# Patient Record
Sex: FEMALE | Race: WHITE | NOT HISPANIC OR LATINO | Employment: FULL TIME | URBAN - METROPOLITAN AREA
[De-identification: names, ages, dates, MRNs, and addresses within clinical notes are randomized per-mention and may not be internally consistent; named-entity substitution may affect disease eponyms.]

---

## 2020-05-26 ENCOUNTER — HOSPITAL ENCOUNTER (OUTPATIENT)
Dept: GENERAL RADIOLOGY | Facility: HOSPITAL | Age: 40
Discharge: HOME | End: 2020-05-26
Attending: FAMILY MEDICINE

## 2021-12-02 ENCOUNTER — OFFICE VISIT (OUTPATIENT)
Dept: OTOLARYNGOLOGY | Facility: CLINIC | Age: 41
End: 2021-12-02

## 2021-12-02 VITALS — WEIGHT: 143.2 LBS | HEIGHT: 65 IN | TEMPERATURE: 97.2 F | BODY MASS INDEX: 23.86 KG/M2

## 2021-12-02 DIAGNOSIS — H71.91 CHOLESTEATOMA OF RIGHT EAR: ICD-10-CM

## 2021-12-02 DIAGNOSIS — H66.002 NON-RECURRENT ACUTE SUPPURATIVE OTITIS MEDIA OF LEFT EAR WITHOUT SPONTANEOUS RUPTURE OF TYMPANIC MEMBRANE: ICD-10-CM

## 2021-12-02 DIAGNOSIS — H90.3 SENSORINEURAL HEARING LOSS (SNHL) OF BOTH EARS: Primary | ICD-10-CM

## 2021-12-02 DIAGNOSIS — H61.21 IMPACTED CERUMEN OF RIGHT EAR: ICD-10-CM

## 2021-12-02 PROCEDURE — 99213 OFFICE O/P EST LOW 20 MIN: CPT | Performed by: OTOLARYNGOLOGY

## 2021-12-02 PROCEDURE — 69210 REMOVE IMPACTED EAR WAX UNI: CPT | Performed by: OTOLARYNGOLOGY

## 2021-12-02 RX ORDER — DIAZEPAM 5 MG/1
TABLET ORAL
COMMUNITY
Start: 2021-11-15

## 2021-12-02 RX ORDER — FEXOFENADINE HCL AND PSEUDOEPHEDRINE HCI 60; 120 MG/1; MG/1
TABLET, EXTENDED RELEASE ORAL
COMMUNITY

## 2021-12-02 RX ORDER — MULTIPLE VITAMINS W/ MINERALS TAB 9MG-400MCG
TAB ORAL
COMMUNITY

## 2021-12-02 RX ORDER — QUETIAPINE FUMARATE 50 MG/1
TABLET, FILM COATED ORAL
COMMUNITY
Start: 2021-11-15

## 2021-12-02 RX ORDER — FLUOXETINE HYDROCHLORIDE 40 MG/1
CAPSULE ORAL
COMMUNITY
Start: 2021-11-12

## 2021-12-02 RX ORDER — FLUCONAZOLE 150 MG/1
TABLET ORAL
COMMUNITY
Start: 2021-11-12

## 2021-12-02 RX ORDER — DOXYCYCLINE HYCLATE 100 MG
TABLET ORAL
COMMUNITY
Start: 2021-11-12

## 2021-12-02 RX ORDER — LEVOTHYROXINE SODIUM 0.05 MG/1
TABLET ORAL
COMMUNITY
Start: 2021-10-12

## 2021-12-02 NOTE — PROGRESS NOTES
Patient Name: Virgie Kim   Visit Date: 12/02/2021   Patient ID: [id number]  Provider: Dong Rangel MD    Sex: female  Location: [facility]   YOB: 1980  Location Address: [address] [place] [address], [address] [place] [address],?[id number]    Primary Care Provider Srinivasa Ogden MD  Location Phone: [phone]    Referring Provider: LASHANDA Rico        Chief Complaint  Earache (New patinet )    Subjective    History of Present Illness  Virgie Kim is a 41 y.o. female who presents to [facility] today as a consult from LASHANDA Rico.    She presents the clinic today for evaluation of conductive hearing loss in the right ear, cholesteatoma of the right ear, recent left ear infection.  She underwent several surgeries about 15 years ago for what sounds to be a cholesteatoma of the right ear.  She notes that her hearing has been down on that side for quite some time.  She does have issues with cerumen, and does use Q-tips.    Recently her hearing decreased on the left side.  She was diagnosed with an ear infection recently finished a course of antibiotics.  She notes that her ear has improved, and she presents for reevaluation as she was very concerned given that this is her only good hearing ear.  She has not had a hearing test in many years.    Past Medical History:   Diagnosis Date   • Anxiety    • Hypothyroidism    • Otalgia        Past Surgical History:   Procedure Laterality Date   • EXTERNAL EAR SURGERY     • TUBAL ABDOMINAL LIGATION           Current Outpatient Medications:   •  diazePAM (VALIUM) 5 MG tablet, , Disp: , Rfl:   •  FLUoxetine (PROzac) 40 MG capsule, , Disp: , Rfl:   •  levothyroxine (SYNTHROID, LEVOTHROID) 50 MCG tablet, , Disp: , Rfl:   •  multivitamin with minerals (MULTIVITAMIN ADULT PO), multivitamin oral tablet take 1 tablet by oral route daily   Active, Disp: , Rfl:   •  doxycycline  "(VIBRAMYICN) 100 MG tablet, TAKE 1 TABLET BY MOUTH TWO TIMES A DAY FOR 10 DAYS, Disp: , Rfl:   •  fexofenadine-pseudoephedrine (Allegra-D Allergy & Congestion)  MG per 12 hr tablet, Allegra-D 12 Hour  mg oral tablet extended release 12 hr take 1 tablet by oral route daily   Active, Disp: , Rfl:   •  fluconazole (DIFLUCAN) 150 MG tablet, TAKE 1 TABLET BY MOUTH FOR ONE DOSE, Disp: , Rfl:   •  levonorgestrel (Mirena, 52 MG,) 20 MCG/24HR IUD, Mirena 20 mcg/24 hr (5 years) intrauterine intrauterine device place 1 device by intrauterine route as directed   Active, Disp: , Rfl:   •  QUEtiapine (SEROquel) 50 MG tablet, , Disp: , Rfl:      Allergies   Allergen Reactions   • Penicillins Unknown - Low Severity       Family History   Problem Relation Age of Onset   • No Known Problems Mother    • No Known Problems Father         Social History     Social History Narrative   • Not on file       Objective     Vital Signs:   Temp 97.2 °F (36.2 °C) (Temporal)   Ht 165.1 cm (65\")   Wt 65 kg (143 lb 3.2 oz)   BMI 23.83 kg/m²       Physical Exam    Ear Cerumen Removal    Date/Time: 12/2/2021 2:50 PM  Performed by: Dong Rangel MD  Authorized by: Dong Rangel MD     Anesthesia:  Local Anesthetic: none  Location details: right ear  Procedure type: instrumentation, alligator forceps, curette   Sedation:  Patient sedated: no            Constitutional   Appearance  · : well developed, well-nourished, alert and in no acute distress, voice clear and strong    Head  Inspection  · : no deformities or lesions  Face  Inspection  · : No facial lesions; House-Brackmann I/VI bilaterally  Palpation  · : No TMJ crepitus nor  muscle tenderness bilaterally    Eyes  Vision  Visual Fields  · : Extraocular movements are intact. No spontaneous or gaze-induced nystagmus.  Conjunctivae  · : clear  Sclerae  · : clear  Pupils and Irises  · : pupils equal, round, and reactive to light.     Ears, Nose, Mouth and " Throat    Ears    External Ears  · : appearance within normal limits, no lesions present  Otoscopic Examination  · : Tympanic membrane appearance within normal on the left, well-aerated middle ear.  Right ear with copious cerumen which was removed.  Tympanic graft in good location with cartilage graft and what appears to be a TORP report in decent position.  Well aerated middle ear.  Hearing  · : intact to conversational voice both ears  Tunning fork testing:     : Perez fork lateralizes to the right side.  Rinne test is positive on the left and equivocal on the right.    Nose    External Nose  · : appearance normal  Intranasal Exam  · : mucosa within normal limits, vestibules normal, no intranasal lesions present, septum midline, sinuses non tender to percussion  Oral Cavity    Oral Mucosa  · : oral mucosa normal without pallor or cyanosis  Lips  · : lip appearance normal  Teeth  · : normal dentition for age  Gums  · : gums pink, non-swollen, no bleeding present  Tongue  · : tongue appearance normal; normal mobility  Palate  · : hard palate normal, soft palate appearance normal with symmetric mobility    Throat    Oropharynx  · : no inflammation or lesions present, tonsils within normal limits  Hypopharynx  · : appearance within normal limits, superior epiglottis within normal limits  Larynx  · : appearance within normal limits, vocal cords within normal limits, no lesions present    Neck  Inspection/Palpation  · : normal appearance, no masses or tenderness, trachea midline; thyroid size normal, nontender, no nodules or masses present on palpation    Respiratory  Respiratory Effort  · : breathing unlabored  Inspection of Chest  · : normal appearance, no retractions    Cardiovascular  Heart  · : regular rate and rhythm    Lymphatic  Neck  · : no lymphadenopathy present  Supraclavicular Nodes  · : no lymphadenopathy present  Preauricular Nodes  · : no lymphadenopathy present    Skin and Subcutaneous Tissue  General  Inspection  · : Regarding face and neck - there are no rashes present, no lesions present, and no areas of discoloration    Neurologic  Cranial Nerves  · : cranial nerves II-XII are grossly intact bilaterally  Gait and Station  · : normal gait, able to stand without diffculty    Psychiatric  Judgement and Insight  · : judgment and insight intact  Mood and Affect  · : mood normal, affect appropriate          Assessment and Plan    Diagnoses and all orders for this visit:    1. Sensorineural hearing loss (SNHL) of both ears (Primary)  -     Comprehensive Hearing Test; Future  -     Tympanometry; Future    2. Non-recurrent acute suppurative otitis media of left ear without spontaneous rupture of tympanic membrane  -     Comprehensive Hearing Test; Future    3. Cholesteatoma of right ear  -     Comprehensive Hearing Test; Future  -     Tympanometry; Future    4. Impacted cerumen of right ear    Examination today revealed cerumen impaction on the left side which I was able to clear.  She has had acicular chain reconstruction with cartilage graft that appears to be in good position and I see no evidence of infection.  Exam of the left ear was normal today.  Tuning fork suggested conductive hearing loss on the right side.  Given her history I will plan on testing her hearing formally at the next clinic visit as my audiologist was not here today.  I have also asked her to refrain from using Q-tips.    Follow Up   No follow-ups on file.  Patient was given instructions and counseling regarding her condition or for health maintenance advice. Please see specific information pulled into the AVS if appropriate.

## 2022-01-10 ENCOUNTER — APPOINTMENT (OUTPATIENT)
Dept: GENERAL RADIOLOGY | Facility: HOSPITAL | Age: 42
End: 2022-01-10

## 2022-01-10 ENCOUNTER — APPOINTMENT (OUTPATIENT)
Dept: ULTRASOUND IMAGING | Facility: HOSPITAL | Age: 42
End: 2022-01-10

## 2022-01-10 ENCOUNTER — HOSPITAL ENCOUNTER (EMERGENCY)
Facility: HOSPITAL | Age: 42
Discharge: HOME | End: 2022-01-10
Attending: EMERGENCY MEDICINE | Admitting: EMERGENCY MEDICINE

## 2022-01-10 VITALS
HEART RATE: 91 BPM | WEIGHT: 139.99 LBS | BODY MASS INDEX: 23.32 KG/M2 | DIASTOLIC BLOOD PRESSURE: 70 MMHG | RESPIRATION RATE: 18 BRPM | HEIGHT: 65 IN | TEMPERATURE: 97.8 F | SYSTOLIC BLOOD PRESSURE: 105 MMHG | OXYGEN SATURATION: 99 %

## 2022-01-10 DIAGNOSIS — L29.9 PRURITUS: ICD-10-CM

## 2022-01-10 DIAGNOSIS — R63.1 POLYDIPSIA: ICD-10-CM

## 2022-01-10 DIAGNOSIS — R17 JAUNDICE OF RECENT ONSET: Primary | ICD-10-CM

## 2022-01-10 LAB
ALBUMIN SERPL-MCNC: 3.8 G/DL (ref 3.5–5.2)
ALBUMIN/GLOB SERPL: 1 G/DL
ALP SERPL-CCNC: 256 U/L (ref 39–117)
ALT SERPL W P-5'-P-CCNC: 87 U/L (ref 1–33)
ANION GAP SERPL CALCULATED.3IONS-SCNC: 12.6 MMOL/L (ref 5–15)
APAP SERPL-MCNC: <5 MCG/ML (ref 0–30)
APTT PPP: 24.2 SECONDS (ref 22.2–34.2)
AST SERPL-CCNC: 85 U/L (ref 1–32)
BASOPHILS # BLD AUTO: 0.09 10*3/MM3 (ref 0–0.2)
BASOPHILS NFR BLD AUTO: 0.6 % (ref 0–1.5)
BILIRUB SERPL-MCNC: 4.6 MG/DL (ref 0–1.2)
BILIRUB UR QL STRIP: NEGATIVE
BUN SERPL-MCNC: 7 MG/DL (ref 6–20)
BUN/CREAT SERPL: 9.7 (ref 7–25)
CALCIUM SPEC-SCNC: 9.7 MG/DL (ref 8.6–10.5)
CHLORIDE SERPL-SCNC: 94 MMOL/L (ref 98–107)
CLARITY UR: CLEAR
CO2 SERPL-SCNC: 26.4 MMOL/L (ref 22–29)
COLOR UR: YELLOW
CREAT SERPL-MCNC: 0.72 MG/DL (ref 0.57–1)
DEPRECATED RDW RBC AUTO: 46.3 FL (ref 37–54)
EOSINOPHIL # BLD AUTO: 0.14 10*3/MM3 (ref 0–0.4)
EOSINOPHIL NFR BLD AUTO: 0.9 % (ref 0.3–6.2)
ERYTHROCYTE [DISTWIDTH] IN BLOOD BY AUTOMATED COUNT: 12.8 % (ref 12.3–15.4)
GFR SERPL CREATININE-BSD FRML MDRD: 90 ML/MIN/1.73
GLOBULIN UR ELPH-MCNC: 4 GM/DL
GLUCOSE SERPL-MCNC: 105 MG/DL (ref 65–99)
GLUCOSE UR STRIP-MCNC: NEGATIVE MG/DL
HAV IGM SERPL QL IA: NORMAL
HBV CORE IGM SERPL QL IA: NORMAL
HBV SURFACE AG SERPL QL IA: NORMAL
HCG INTACT+B SERPL-ACNC: <0.5 MIU/ML
HCT VFR BLD AUTO: 46.8 % (ref 34–46.6)
HCV AB SER DONR QL: NORMAL
HGB BLD-MCNC: 15.6 G/DL (ref 12–15.9)
HGB UR QL STRIP.AUTO: NEGATIVE
HOLD SPECIMEN: NORMAL
HOLD SPECIMEN: NORMAL
IMM GRANULOCYTES # BLD AUTO: 0.24 10*3/MM3 (ref 0–0.05)
IMM GRANULOCYTES NFR BLD AUTO: 1.6 % (ref 0–0.5)
INR PPP: 0.91 (ref 2–3)
KETONES UR QL STRIP: NEGATIVE
LEUKOCYTE ESTERASE UR QL STRIP.AUTO: NEGATIVE
LIPASE SERPL-CCNC: 73 U/L (ref 13–60)
LYMPHOCYTES # BLD AUTO: 1.51 10*3/MM3 (ref 0.7–3.1)
LYMPHOCYTES NFR BLD AUTO: 10 % (ref 19.6–45.3)
MCH RBC QN AUTO: 32.3 PG (ref 26.6–33)
MCHC RBC AUTO-ENTMCNC: 33.3 G/DL (ref 31.5–35.7)
MCV RBC AUTO: 96.9 FL (ref 79–97)
MONOCYTES # BLD AUTO: 0.41 10*3/MM3 (ref 0.1–0.9)
MONOCYTES NFR BLD AUTO: 2.7 % (ref 5–12)
NEUTROPHILS NFR BLD AUTO: 12.73 10*3/MM3 (ref 1.7–7)
NEUTROPHILS NFR BLD AUTO: 84.2 % (ref 42.7–76)
NITRITE UR QL STRIP: NEGATIVE
NRBC BLD AUTO-RTO: 0 /100 WBC (ref 0–0.2)
PH UR STRIP.AUTO: 6.5 [PH] (ref 5–8)
PLATELET # BLD AUTO: 380 10*3/MM3 (ref 140–450)
PMV BLD AUTO: 9.7 FL (ref 6–12)
POTASSIUM SERPL-SCNC: 4 MMOL/L (ref 3.5–5.2)
PROT SERPL-MCNC: 7.8 G/DL (ref 6–8.5)
PROT UR QL STRIP: NEGATIVE
PROTHROMBIN TIME: 9.7 SECONDS (ref 9.4–12)
RBC # BLD AUTO: 4.83 10*6/MM3 (ref 3.77–5.28)
SODIUM SERPL-SCNC: 133 MMOL/L (ref 136–145)
SP GR UR STRIP: <=1.005 (ref 1–1.03)
TSH SERPL DL<=0.05 MIU/L-ACNC: 0.69 UIU/ML (ref 0.27–4.2)
UROBILINOGEN UR QL STRIP: NORMAL
WBC NRBC COR # BLD: 15.12 10*3/MM3 (ref 3.4–10.8)
WHOLE BLOOD HOLD SPECIMEN: NORMAL
WHOLE BLOOD HOLD SPECIMEN: NORMAL

## 2022-01-10 PROCEDURE — 80050 GENERAL HEALTH PANEL: CPT

## 2022-01-10 PROCEDURE — 81003 URINALYSIS AUTO W/O SCOPE: CPT

## 2022-01-10 PROCEDURE — 80143 DRUG ASSAY ACETAMINOPHEN: CPT

## 2022-01-10 PROCEDURE — 85610 PROTHROMBIN TIME: CPT

## 2022-01-10 PROCEDURE — 80074 ACUTE HEPATITIS PANEL: CPT

## 2022-01-10 PROCEDURE — 36415 COLL VENOUS BLD VENIPUNCTURE: CPT

## 2022-01-10 PROCEDURE — 71045 X-RAY EXAM CHEST 1 VIEW: CPT

## 2022-01-10 PROCEDURE — 84702 CHORIONIC GONADOTROPIN TEST: CPT

## 2022-01-10 PROCEDURE — 76705 ECHO EXAM OF ABDOMEN: CPT

## 2022-01-10 PROCEDURE — 99283 EMERGENCY DEPT VISIT LOW MDM: CPT

## 2022-01-10 PROCEDURE — 83690 ASSAY OF LIPASE: CPT

## 2022-01-10 PROCEDURE — 83036 HEMOGLOBIN GLYCOSYLATED A1C: CPT

## 2022-01-10 PROCEDURE — U0004 COV-19 TEST NON-CDC HGH THRU: HCPCS

## 2022-01-10 PROCEDURE — 85730 THROMBOPLASTIN TIME PARTIAL: CPT

## 2022-01-10 RX ORDER — SODIUM CHLORIDE 0.9 % (FLUSH) 0.9 %
10 SYRINGE (ML) INJECTION AS NEEDED
Status: DISCONTINUED | OUTPATIENT
Start: 2022-01-10 | End: 2022-01-11 | Stop reason: HOSPADM

## 2022-01-10 RX ADMIN — SODIUM CHLORIDE 1000 ML: 9 INJECTION, SOLUTION INTRAVENOUS at 17:19

## 2022-01-10 RX ADMIN — SODIUM CHLORIDE 500 ML: 9 INJECTION, SOLUTION INTRAVENOUS at 21:37

## 2022-01-10 NOTE — ED PROVIDER NOTES
Subjective   Patient is a 41-year-old female that presents to the emergency department today via POV accompanied by a friend for polydipsia, pruritus and jaundice.  Patient states last Monday she saw her primary care provider for generalized illness, discolored urine, upper abdominal pain, and cough.  Patient states that she thought her upper abdomen area was hurting because she had been coughing so much.          Review of Systems   Constitutional: Positive for fatigue. Negative for chills and fever.   HENT: Negative for congestion, ear pain and sore throat.    Eyes: Negative for pain.   Respiratory: Negative for cough, chest tightness and shortness of breath.    Cardiovascular: Negative for chest pain.   Gastrointestinal: Negative for abdominal pain, diarrhea, nausea and vomiting.   Endocrine: Positive for polydipsia.   Genitourinary: Negative for decreased urine volume, flank pain and hematuria.        Last week when patient visited her primary care physician she described her urine to be orange in nature as if she is taking Pyridium.  However today she states her urine is clear.   Musculoskeletal: Negative for joint swelling.   Skin: Positive for color change. Negative for pallor.   Neurological: Negative for seizures and headaches.   All other systems reviewed and are negative.      Past Medical History:   Diagnosis Date   • Anxiety    • Hypothyroidism    • Otalgia        Allergies   Allergen Reactions   • Penicillins Unknown - Low Severity       Past Surgical History:   Procedure Laterality Date   • EXTERNAL EAR SURGERY     • TUBAL ABDOMINAL LIGATION         Family History   Problem Relation Age of Onset   • No Known Problems Mother    • No Known Problems Father        Social History     Socioeconomic History   • Marital status:    Tobacco Use   • Smoking status: Never Smoker   • Smokeless tobacco: Never Used   Vaping Use   • Vaping Use: Never used   Substance and Sexual Activity   • Alcohol use: Never    • Drug use: Never   • Sexual activity: Defer           Objective   Physical Exam  Vitals and nursing note reviewed.   Constitutional:       General: She is not in acute distress.     Appearance: Normal appearance. She is not toxic-appearing.   HENT:      Head: Normocephalic and atraumatic.      Mouth/Throat:      Mouth: Mucous membranes are dry.      Comments: Oral mucosa and lips are dry.  Lips are chapped and cracked in nature.  Eyes:      General: No scleral icterus.  Cardiovascular:      Rate and Rhythm: Normal rate and regular rhythm.      Pulses: Normal pulses.      Heart sounds: Normal heart sounds.   Pulmonary:      Effort: Pulmonary effort is normal. No respiratory distress.      Breath sounds: Normal breath sounds. No stridor. No wheezing, rhonchi or rales.   Abdominal:      General: Abdomen is flat. There is no distension.      Palpations: Abdomen is soft.      Tenderness: There is no abdominal tenderness.      Comments: Patient states last week she had upper abdominal pain that she described as soreness.  She did see her primary care doctor for that as well as her other symptoms and she was diagnosed with an upper respiratory infection and given Levaquin.  Denies any abdominal pain at this time.   Musculoskeletal:         General: Normal range of motion.      Cervical back: Normal range of motion and neck supple.   Skin:     General: Skin is warm and dry.      Capillary Refill: Capillary refill takes less than 2 seconds.      Coloration: Skin is jaundiced.      Comments: Sclera patient's has have a very faint yellow tint to them.  Patient states her skin is more bronze compared to her baseline.   Neurological:      Mental Status: She is alert and oriented to person, place, and time. Mental status is at baseline.   Psychiatric:         Mood and Affect: Mood normal.         Behavior: Behavior normal.         Thought Content: Thought content normal.         Judgment: Judgment normal.      Comments:  Patient is a very pleasant         Procedures           ED Course  ED Course as of 01/12/22 1634   Mon Jacob 10, 2022   1645 Patient states 1 time she does remember, several years ago, being told that she may have hep C and received follow-up for that.  At her 3-week magdiel from her initial being told that, her laboratory work revealed she did not have hep C.  She states only thing she could have thought of at that time is that maybe she contracted it from getting a tattoo.  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 [MS]   1741 Patient's color including skin tone and sclera have improved since previous assessment and IV fluids.  Oral mucous membranes have noted to improve.  Continue to await other labs to return. Pt was offered benadryl for her itchiness and she advises that she does not need it now and states that the pruritis has subsided.  substantially  [MS]      ED Course User Index  [MS] DontaeJazmine, LOUISE                                                 MDM  Number of Diagnoses or Management Options  Jaundice of recent onset: new and requires workup  Polydipsia: new and does not require workup  Pruritus: new and does not require workup  Diagnosis management comments: Patient is a 41-year-old female who is awake alert and oriented and presents emergency department today with fatigue and concerns of recent skin color change.  Patient states she recently has noticed her skin becoming yellow, she has a friend that is a nurse who told her that her eyes were yellow as well as need to report to the hospital.  On arrival patient did have a very faint yellow tent to sclera and her skin was bronze however patient states she is typically dark complected.  Is noticed that she had cracked oral mucosa membranes.  Previously this week she stated her urine was dark in color but has improved.  Additionally at the beginning of the week she had abdominal pain, that she described as a soreness and  cramping as if she had done a lot of of exercise, this has improved as well.  Patient was given IV fluids to help with possible dehydration.  After fluids patient's skin color as well as sclera appear to have improved.  Patient states she feels better after that.  Patient's visitor also states that she saw improvement in patient after IV fluids.  Additional work-up was completed to rule out any concerns for hepatitis, gallbladder issues, or respiratory issues that may be responsible for the slightly elevated WBC.  All test were relatively benign however patient was found to continue to have an increased AST/ALT.  Patient was given strict instructions as when to return to the ER as well as a referral to GI.  Patient understands the importance of following up and vocalizes that she will do so.    I have spoke with the patient and I have explained the patient´s condition, diagnoses and treatment plan based on the information available to me at this time. I have answered all questions and addressed any concerns. The patient has a good understanding of the patient´s diagnosis, condition, and treatment plan as can be expected at this point. The vital signs have been stable. The patient´s condition is stable and appropriate for discharge from the emergency department.      The patient will pursue further outpatient evaluation with the primary care physician or other designated or consulting physician as outlined in the discharge instructions. They are agreeable to this plan of care and follow-up instructions have been explained in detail. The patient has received these instructions in written format and have expressed an understanding of the discharge instructions. The patient is aware that any significant change in condition or worsening of symptoms should prompt an immediate return to this or the closest emergency department or call to 911.         Amount and/or Complexity of Data Reviewed  Clinical lab tests: reviewed  and ordered  Tests in the radiology section of CPT®: reviewed and ordered  Review and summarize past medical records: yes (I have personally reviewed patient's previous medical encounters.  )    Risk of Complications, Morbidity, and/or Mortality  Presenting problems: moderate  Diagnostic procedures: low  Management options: low    Patient Progress  Patient progress: stable      Final diagnoses:   Jaundice of recent onset   Polydipsia   Pruritus       ED Disposition  ED Disposition     ED Disposition Condition Comment    Discharge Stable Pt d/c'ed, IV cath removed and intact.           Keo Matias MD  [address] [place] [address] [address] [place] [id number] [phone]    In 1 week           Medication List      No changes were made to your prescriptions during this visit.          Jazmine Diggs, APRN  01/12/22 8130

## 2022-01-11 LAB
HBA1C MFR BLD: 5.1 % (ref 4.8–5.6)
SARS-COV-2 RNA PNL SPEC NAA+PROBE: NOT DETECTED

## 2022-01-11 NOTE — ED PROVIDER NOTES
Subjective   Patient is a 41-year-old female that presents to the emergency department today via POV accompanied by a friend for polydipsia, pruritus and jaundice.  Patient states last Monday she saw her primary care provider for generalized illness, discolored urine, upper abdominal pain, and cough at which time she was diagnosed with an URI.  Patient states that she thought her upper abdomen area was hurting because she had been coughing so much. She denies any abdominal pain at this time.       History provided by:  Patient   used: No        Review of Systems    Past Medical History:   Diagnosis Date   • Anxiety    • Hypothyroidism    • Otalgia        Allergies   Allergen Reactions   • Penicillins Unknown - Low Severity       Past Surgical History:   Procedure Laterality Date   • EXTERNAL EAR SURGERY     • TUBAL ABDOMINAL LIGATION         Family History   Problem Relation Age of Onset   • No Known Problems Mother    • No Known Problems Father        Social History     Socioeconomic History   • Marital status:    Tobacco Use   • Smoking status: Never Smoker   • Smokeless tobacco: Never Used   Vaping Use   • Vaping Use: Never used   Substance and Sexual Activity   • Alcohol use: Never   • Drug use: Never   • Sexual activity: Defer           Objective   Physical Exam    Procedures           ED Course  ED Course as of 01/11/22 0257   Mon Jacob 10, 2022   1741 Patient's color including skin tone and sclera have improved since previous assessment and IV fluids.  Oral mucous membranes have noted to improve.  Continue to await other labs to return. Pt was offered benadryl for her itchiness and she advises that she does not need it now and states that the pruritis has subsided.  substantially  [MS]      ED Course User Index  [MS] Jazmine Diggs, APRN                                                 [facility]    Final diagnoses:   None       ED Disposition  ED Disposition     None          No  follow-up provider specified.       Medication List      No changes were made to your prescriptions during this visit.

## 2022-01-11 NOTE — DISCHARGE INSTRUCTIONS
Please call Dr. Matias's office tomorrow and try to make the first available appointment. If you are unable to get into his office in a timely manner, please follow-up with your primary care physician. This information has been provided for you. Please continue to increase your oral fluid intake particularly water. The results of your hepatitis panel can be seen on your [employer] tiffanie. The hospital will only call you if they are abnormal.  Turn to the ER immediately if you develop a fever that cannot be controlled with Tylenol or Motrin, have worsening of skin color, a worsening of itching sensation, if you develop abdominal pain, or develop nausea, vomiting, or diarrhea.  You may take over-the-counter Benadryl for your itching skin and bathe in Aveeno oatmeal bath.

## 2022-01-18 ENCOUNTER — OFFICE VISIT (OUTPATIENT)
Dept: OTOLARYNGOLOGY | Facility: CLINIC | Age: 42
End: 2022-01-18

## 2022-01-18 ENCOUNTER — PROCEDURE VISIT (OUTPATIENT)
Dept: OTOLARYNGOLOGY | Facility: CLINIC | Age: 42
End: 2022-01-18

## 2022-01-18 VITALS — HEIGHT: 65 IN | WEIGHT: 135.4 LBS | BODY MASS INDEX: 22.56 KG/M2 | TEMPERATURE: 97.8 F

## 2022-01-18 DIAGNOSIS — H69.83 DYSFUNCTION OF BOTH EUSTACHIAN TUBES: ICD-10-CM

## 2022-01-18 DIAGNOSIS — H90.71 MIXED CONDUCTIVE AND SENSORINEURAL HEARING LOSS OF RIGHT EAR, UNSPECIFIED HEARING STATUS ON CONTRALATERAL SIDE: ICD-10-CM

## 2022-01-18 DIAGNOSIS — H90.A11 CONDUCTIVE HEARING LOSS OF RIGHT EAR WITH RESTRICTED HEARING OF LEFT EAR: ICD-10-CM

## 2022-01-18 DIAGNOSIS — H71.91 CHOLESTEATOMA OF RIGHT EAR: Primary | ICD-10-CM

## 2022-01-18 DIAGNOSIS — H90.42 SENSORINEURAL HEARING LOSS, UNILATERAL, LEFT EAR, WITH UNRESTRICTED HEARING ON THE CONTRALATERAL SIDE: ICD-10-CM

## 2022-01-18 DIAGNOSIS — H65.31 CHRONIC MUCOID OTITIS MEDIA OF RIGHT EAR: ICD-10-CM

## 2022-01-18 PROCEDURE — 99213 OFFICE O/P EST LOW 20 MIN: CPT | Performed by: OTOLARYNGOLOGY

## 2022-01-18 PROCEDURE — 92567 TYMPANOMETRY: CPT | Performed by: AUDIOLOGIST

## 2022-01-18 PROCEDURE — 92557 COMPREHENSIVE HEARING TEST: CPT | Performed by: AUDIOLOGIST

## 2022-01-18 RX ORDER — PREDNISONE 50 MG/1
TABLET ORAL
COMMUNITY
Start: 2022-01-03

## 2022-01-18 RX ORDER — LEVOFLOXACIN 500 MG/1
TABLET, FILM COATED ORAL
COMMUNITY
Start: 2022-01-03

## 2022-01-21 PROBLEM — H71.91 CHOLESTEATOMA OF RIGHT EAR: Status: ACTIVE | Noted: 2022-01-21

## 2022-01-21 PROBLEM — H69.83 DYSFUNCTION OF BOTH EUSTACHIAN TUBES: Status: ACTIVE | Noted: 2022-01-21

## 2022-01-21 PROBLEM — H65.31 CHRONIC MUCOID OTITIS MEDIA OF RIGHT EAR: Status: ACTIVE | Noted: 2022-01-21

## 2022-01-21 PROBLEM — H90.A11 CONDUCTIVE HEARING LOSS OF RIGHT EAR WITH RESTRICTED HEARING OF LEFT EAR: Status: ACTIVE | Noted: 2022-01-21

## 2022-01-21 PROBLEM — H69.93 DYSFUNCTION OF BOTH EUSTACHIAN TUBES: Status: ACTIVE | Noted: 2022-01-21

## 2022-01-21 PROCEDURE — 69433 CREATE EARDRUM OPENING: CPT | Performed by: OTOLARYNGOLOGY

## 2022-01-21 NOTE — PROGRESS NOTES
Patient Name: Virgie Kim   Visit Date: 01/18/2022   Patient ID: [id number]  Provider: Dong Rangel MD    Sex: female  Location: [facility]   YOB: 1980  Location Address: [address] [place] [address], [address],?[place]?[address]    Primary Care Provider Srinivasa Ogden MD  Location Phone: [phone]    Referring Provider: No ref. provider found        Chief Complaint  Follow-up (f/u with Audio results)    Subjective    History of Present Illness  Virgie Kim is a 41 y.o. female who presents to [facility] today as a consult from No ref. provider found.    She presents the clinic today for follow-up regarding right ear conductive hearing loss and mucoid effusion with significant retraction of the tympanic membrane. She had several surgeries in the right ear for cholesteatoma, the last one about 15 years ago. She notes that her hearing has been down since the time of that surgery.    An audiogram was performed today and shows normal hearing in the left ear sloping down to mild hearing loss. In the right ear there is an air-bone gap of about 30 dB for low frequencies and a mixed hearing loss above 3000 Hz. Tympanogram was flat on the right side and showed retraction and negative pressure on the left. Word discrimination scores are 100% bilaterally.     Past Medical History:   Diagnosis Date   • Anxiety    • Cholesteatoma    • HL (hearing loss)    • Hypothyroidism    • Impacted cerumen    • Otalgia    • Otitis media        Past Surgical History:   Procedure Laterality Date   • EXTERNAL EAR SURGERY     • TUBAL ABDOMINAL LIGATION           Current Outpatient Medications:   •  levonorgestrel (Mirena, 52 MG,) 20 MCG/24HR IUD, Mirena 20 mcg/24 hr (5 years) intrauterine intrauterine device place 1 device by intrauterine route as directed   Active, Disp: , Rfl:   •  diazePAM (VALIUM) 5 MG tablet, , Disp: , Rfl:   •  doxycycline  "(VIBRAMYICN) 100 MG tablet, TAKE 1 TABLET BY MOUTH TWO TIMES A DAY FOR 10 DAYS, Disp: , Rfl:   •  fexofenadine-pseudoephedrine (Allegra-D Allergy & Congestion)  MG per 12 hr tablet, Allegra-D 12 Hour  mg oral tablet extended release 12 hr take 1 tablet by oral route daily   Active, Disp: , Rfl:   •  fluconazole (DIFLUCAN) 150 MG tablet, TAKE 1 TABLET BY MOUTH FOR ONE DOSE, Disp: , Rfl:   •  FLUoxetine (PROzac) 40 MG capsule, , Disp: , Rfl:   •  levoFLOXacin (LEVAQUIN) 500 MG tablet, , Disp: , Rfl:   •  levothyroxine (SYNTHROID, LEVOTHROID) 50 MCG tablet, , Disp: , Rfl:   •  multivitamin with minerals (MULTIVITAMIN ADULT PO), multivitamin oral tablet take 1 tablet by oral route daily   Active, Disp: , Rfl:   •  predniSONE (DELTASONE) 50 MG tablet, , Disp: , Rfl:   •  QUEtiapine (SEROquel) 50 MG tablet, , Disp: , Rfl:      Allergies   Allergen Reactions   • Penicillins Unknown - Low Severity       Family History   Problem Relation Age of Onset   • No Known Problems Mother    • No Known Problems Father         Social History     Social History Narrative   • Not on file       Objective     Vital Signs:   Temp 97.8 °F (36.6 °C) (Temporal)   Ht 165.1 cm (65\")   Wt 61.4 kg (135 lb 6.4 oz)   BMI 22.53 kg/m²       Physical Exam    Right Myringotomy Tube Insertion    Date/Time: 1/21/2022 1:16 PM  Performed by: Dong Rangel MD  Authorized by: Dong Rangel MD         Indications:  Right myringotomy tube insertion was felt to be indicated for otitis media with effusion, eustachian tube dysfunction and failed medical management.     Procedure:  The ear canal and tympanic membrane were visualized with the otologic microscope. A right myringotomy tube insertion was performed. After anesthesia, an incision was made in the right anterior mid tympanic membrane. The middle ear was inspected and noted to have mucoid effusion and edema.      Exam revealed significant retraction of the tympanic membrane with " atelectatic appearance and mucoid effusion anteriorly. The prosthesis can be seen with thin tympanic membrane tightly draping over the cartilage graft and prosthesis. An incision was made and scant mucoid fluid was suctioned out of the middle ear. A small router bobbin tube was placed anteriorly and Ciprodex drops were infused.        Constitutional   Appearance  · : well developed, well-nourished, alert and in no acute distress, voice clear and strong    Head  Inspection  · : no deformities or lesions  Face  Inspection  · : No facial lesions; House-Brackmann I/VI bilaterally  Palpation  · : No TMJ crepitus nor  muscle tenderness bilaterally    Eyes  Vision  Visual Fields  · : Extraocular movements are intact. No spontaneous or gaze-induced nystagmus.  Conjunctivae  · : clear  Sclerae  · : clear  Pupils and Irises  · : pupils equal, round, and reactive to light.     Ears, Nose, Mouth and Throat    Ears    External Ears  · : appearance within normal limits, no lesions present  Otoscopic Examination  · : Tympanic membrane appearance severely retracted on the right side, refer to above findings  Hearing  · : intact to conversational voice both ears  Tunning fork testing:     :    Nose    External Nose  · : appearance normal  Intranasal Exam  · : mucosa within normal limits, vestibules normal, no intranasal lesions present, septum midline, sinuses non tender to percussion  Oral Cavity    Oral Mucosa  · : oral mucosa normal without pallor or cyanosis  Lips  · : lip appearance normal  Teeth  · : normal dentition for age  Gums  · : gums pink, non-swollen, no bleeding present  Tongue  · : tongue appearance normal; normal mobility  Palate  · : hard palate normal, soft palate appearance normal with symmetric mobility    Throat    Oropharynx  · : no inflammation or lesions present, tonsils within normal limits  Hypopharynx  · : appearance within normal limits, superior epiglottis within normal limits  Larynx  · :  appearance within normal limits, vocal cords within normal limits, no lesions present    Neck  Inspection/Palpation  · : normal appearance, no masses or tenderness, trachea midline; thyroid size normal, nontender, no nodules or masses present on palpation    Respiratory  Respiratory Effort  · : breathing unlabored  Inspection of Chest  · : normal appearance, no retractions    Cardiovascular  Heart  · : regular rate and rhythm    Lymphatic  Neck  · : no lymphadenopathy present  Supraclavicular Nodes  · : no lymphadenopathy present  Preauricular Nodes  · : no lymphadenopathy present    Skin and Subcutaneous Tissue  General Inspection  · : Regarding face and neck - there are no rashes present, no lesions present, and no areas of discoloration    Neurologic  Cranial Nerves  · : cranial nerves II-XII are grossly intact bilaterally  Gait and Station  · : normal gait, able to stand without diffculty    Psychiatric  Judgement and Insight  · : judgment and insight intact  Mood and Affect  · : mood normal, affect appropriate          Assessment and Plan    Diagnoses and all orders for this visit:    1. Cholesteatoma of right ear (Primary)    2. Dysfunction of both eustachian tubes    3. Chronic mucoid otitis media of right ear    4. Conductive hearing loss of right ear with restricted hearing of left ear    Other orders  -     $ Myringotomy    I discussed the hearing test results. Examination revealed severe retraction on the right side with eardrum draping over the middle ear structures. I discussed treatment options including myringotomy and PE tube placement which we decided to proceed with. I was able to perform the procedure today in the clinic, and she tolerated this well. I will plan on seeing her back in the clinic to reassess the ear in the next several months.    Follow Up   No follow-ups on file.  Patient was given instructions and counseling regarding her condition or for health maintenance advice. Please see  specific information pulled into the AVS if appropriate.

## 2022-02-17 ENCOUNTER — OFFICE VISIT (OUTPATIENT)
Dept: OBSTETRICS AND GYNECOLOGY | Facility: CLINIC | Age: 42
End: 2022-02-17

## 2022-02-17 VITALS
DIASTOLIC BLOOD PRESSURE: 77 MMHG | HEIGHT: 65 IN | SYSTOLIC BLOOD PRESSURE: 122 MMHG | WEIGHT: 138 LBS | HEART RATE: 99 BPM | BODY MASS INDEX: 22.99 KG/M2

## 2022-02-17 DIAGNOSIS — Z11.3 SCREEN FOR STD (SEXUALLY TRANSMITTED DISEASE): ICD-10-CM

## 2022-02-17 DIAGNOSIS — Z01.419 ENCOUNTER FOR GYNECOLOGICAL EXAMINATION WITHOUT ABNORMAL FINDING: Primary | ICD-10-CM

## 2022-02-17 LAB
HBV SURFACE AG SERPL QL IA: NORMAL
HIV1+2 AB SER QL: NORMAL
T PALLIDUM IGG SER QL: NORMAL

## 2022-02-17 PROCEDURE — 87591 N.GONORRHOEAE DNA AMP PROB: CPT | Performed by: OBSTETRICS & GYNECOLOGY

## 2022-02-17 PROCEDURE — 86780 TREPONEMA PALLIDUM: CPT | Performed by: OBSTETRICS & GYNECOLOGY

## 2022-02-17 PROCEDURE — 87340 HEPATITIS B SURFACE AG IA: CPT | Performed by: OBSTETRICS & GYNECOLOGY

## 2022-02-17 PROCEDURE — 99396 PREV VISIT EST AGE 40-64: CPT | Performed by: OBSTETRICS & GYNECOLOGY

## 2022-02-17 PROCEDURE — 86803 HEPATITIS C AB TEST: CPT | Performed by: OBSTETRICS & GYNECOLOGY

## 2022-02-17 PROCEDURE — G0123 SCREEN CERV/VAG THIN LAYER: HCPCS | Performed by: OBSTETRICS & GYNECOLOGY

## 2022-02-17 PROCEDURE — G0432 EIA HIV-1/HIV-2 SCREEN: HCPCS | Performed by: OBSTETRICS & GYNECOLOGY

## 2022-02-17 PROCEDURE — 87491 CHLMYD TRACH DNA AMP PROBE: CPT | Performed by: OBSTETRICS & GYNECOLOGY

## 2022-02-17 NOTE — PROGRESS NOTES
"Well Woman Visit    CC: Annual well woman exam       HPI:   41 y.o.No obstetric history on file. Contraception or HRT: Contraception:  Bilateral salpingectomy  Menses:   q mon, lasts 5 days, no heavy days  Pain:  None  Incontinence concerns: No  Hx of abnormal pap:  No  Pt has no complaints today. states is very stressed and her current period is lasting longer than usual but she does that when she is stressed.      History: PMHx, Meds, Allergies, PSHx, Social Hx, and POBHx all reviewed and updated.      PHYSICAL EXAM:  /77   Pulse 99   Ht 165.1 cm (65\")   Wt 62.6 kg (138 lb)   LMP 01/29/2022 (Approximate)   BMI 22.96 kg/m²   General- NAD, alert and oriented, appropriate  Psych- Normal mood, good memory  Neck- No masses, no thyroid enlargement  CV- Regular rhythm, no murnurs  Resp- CTA to bases, no wheezes  Abdomen- Soft, non distended, non tender, no masses    Breast left-  Bilaterally symmetrical, no masses, non tender, no nipple discharge  Breast right- Bilaterally symmetrical, no masses, non tender, no nipple discharge    External genitalia- Normal female, no lesions  Urethra/meatus- Normal, no masses, non tender, no prolapse  Bladder- Normal, no masses, non tender, no prolapse  Vagina- Normal, no atrophy, no lesions, no discharge, no prolapse  Cvx- Normal, no lesions, no discharge, No cervical motion tenderness  Uterus- Normal size, shape & consistency.  Non tender, mobile, & no prolapse  Adnexa- No mass, non tender  Anus/Rectum/Perineum- Not performed    Lymphatic- No palpable neck, axillary, or groin nodes  Ext- No edema, no cyanosis    Skin- No lesions, no rashes, no acanthosis nigricans        ASSESSMENT and PLAN:  WWE    Diagnoses and all orders for this visit:    1. Encounter for gynecological examination without abnormal finding (Primary)  -     Mammo Screening Digital Tomosynthesis Bilateral With CAD; Future  -     IGP, CtNg, Rfx Aptima HPV ASCU    2. Screen for STD (sexually transmitted " disease)  -     Hepatitis B Surface Antigen  -     Hepatitis C Antibody  -     HIV-1 & HIV-2 Antibodies  -     T Pallidum Antibody (FTA-Ab)  -     IGP, CtNg, Rfx Aptima HPV ASCU    desires full std testing. Denies sx.    Counseling:     Track menses, RTO IF <q21d, >7d long, or heavy    Domestic violence/abuse screen: negative    Depression screen: no SI    Preventative:   BREAST HEALTH- Monthly self breast exam importance and how to reviewed. MMG and/or MRI (prn) reviewed per society guidelines and her individual history. Mammo/MRI screen: Updated today.  CERVICAL CANCER Screening- Reviewed current ASCCP guidelines for screening w and wo cotest HPV, age specific.  Screen: Updated today.  COLON CANCER Screening- Reviewed current medical society guidelines and options.  Colonoscopy screen:  Not medically needed.  SEXUAL HEALTH: STD screening recommended.  Ordered.  VACCINATIONS Recommended: Flu annually.  Importance discussed, risk being unvaccinated reviewed.  Questions answered  Smoking status- NON SMOKER.  Importance of avoiding second hand smoke.  Myriad: Does not qualify.      She understands the importance of having any ordered tests to be performed in a timely fashion.  She is encouraged to review her results online and/or contact or office if she has questions.     Follow Up:  Return if symptoms worsen or fail to improve.      Ann Don, APRN  02/17/2022

## 2022-02-18 LAB — HCV AB SER DONR QL: NORMAL

## 2022-02-21 LAB
C TRACH RRNA CVX QL NAA+PROBE: NEGATIVE
CONV .: NORMAL
CYTOLOGIST CVX/VAG CYTO: NORMAL
CYTOLOGY CVX/VAG DOC CYTO: NORMAL
CYTOLOGY CVX/VAG DOC THIN PREP: NORMAL
DX ICD CODE: NORMAL
HIV 1 & 2 AB SER-IMP: NORMAL
N GONORRHOEA RRNA CVX QL NAA+PROBE: NEGATIVE
OTHER STN SPEC: NORMAL
STAT OF ADQ CVX/VAG CYTO-IMP: NORMAL

## 2022-03-15 ENCOUNTER — OFFICE VISIT (OUTPATIENT)
Dept: OTOLARYNGOLOGY | Facility: CLINIC | Age: 42
End: 2022-03-15

## 2022-03-15 VITALS — TEMPERATURE: 97.6 F | WEIGHT: 143.2 LBS | HEIGHT: 65 IN | BODY MASS INDEX: 23.86 KG/M2

## 2022-03-15 DIAGNOSIS — H90.A11 CONDUCTIVE HEARING LOSS OF RIGHT EAR WITH RESTRICTED HEARING OF LEFT EAR: ICD-10-CM

## 2022-03-15 DIAGNOSIS — H71.91 CHOLESTEATOMA OF RIGHT EAR: Primary | ICD-10-CM

## 2022-03-15 DIAGNOSIS — H69.83 DYSFUNCTION OF BOTH EUSTACHIAN TUBES: ICD-10-CM

## 2022-03-15 DIAGNOSIS — H65.31 CHRONIC MUCOID OTITIS MEDIA OF RIGHT EAR: ICD-10-CM

## 2022-03-15 DIAGNOSIS — H61.21 IMPACTED CERUMEN OF RIGHT EAR: ICD-10-CM

## 2022-03-15 PROCEDURE — 69210 REMOVE IMPACTED EAR WAX UNI: CPT | Performed by: OTOLARYNGOLOGY

## 2022-03-15 PROCEDURE — 99213 OFFICE O/P EST LOW 20 MIN: CPT | Performed by: OTOLARYNGOLOGY

## 2022-03-15 NOTE — PROGRESS NOTES
Patient Name: Virgie Kim   Visit Date: 03/15/2022   Patient ID: [id number]  Provider: Dong Rangel MD    Sex: female  Location: [facility] [place] [facility]   YOB: 1980  Location Address: [address] [place] [address], [address] [place] [address],?[place]?[id number]    Primary Care Provider Srinivasa Ogden MD  Location Phone: [phone]    Referring Provider: No ref. provider found        Chief Complaint  Follow-up (6 week f/u ear tube placement in office )    Subjective    History of Present Illness  Virgie Kim is a 41 y.o. female who presents to [facility] today as a consult from No ref. provider found.    She presents the clinic today for follow-up regarding history of radiopacities, and eustachian tube dysfunction with severe eardrum retraction.  She had a PE tube placed last clinic visit and informs me that she has had 2 episodes where she felt ear pressure.  Denies any ear drainage or purulence.  Notes that she feels that hearing is slightly better in the right ear.  Has also had pressure symptoms on the left side without any hearing loss.    Past Medical History:   Diagnosis Date   • Anxiety    • Cholesteatoma    • HL (hearing loss)    • Hypothyroidism    • Impacted cerumen    • Otalgia    • Otitis media        Past Surgical History:   Procedure Laterality Date   • EXTERNAL EAR SURGERY     • LASER ABLATION     • TUBAL ABDOMINAL LIGATION           Current Outpatient Medications:   •  diazePAM (VALIUM) 5 MG tablet, , Disp: , Rfl:   •  levothyroxine (SYNTHROID, LEVOTHROID) 50 MCG tablet, , Disp: , Rfl:   •  multivitamin with minerals tablet tablet, multivitamin oral tablet take 1 tablet by oral route daily   Active, Disp: , Rfl:   •  triamcinolone (KENALOG) 0.1 % ointment, , Disp: , Rfl:   •  doxycycline (VIBRAMYICN) 100 MG tablet, TAKE 1 TABLET BY MOUTH TWO TIMES A DAY FOR 10 DAYS, Disp: , Rfl:   •  fexofenadine-pseudoephedrine (ALLEGRA-D)  MG  "per 12 hr tablet, Allegra-D 12 Hour  mg oral tablet extended release 12 hr take 1 tablet by oral route daily   Active, Disp: , Rfl:   •  fluconazole (DIFLUCAN) 150 MG tablet, TAKE 1 TABLET BY MOUTH FOR ONE DOSE, Disp: , Rfl:   •  FLUoxetine (PROzac) 40 MG capsule, , Disp: , Rfl:   •  levoFLOXacin (LEVAQUIN) 500 MG tablet, , Disp: , Rfl:   •  levonorgestrel (Mirena, 52 MG,) 20 MCG/24HR IUD, Mirena 20 mcg/24 hr (5 years) intrauterine intrauterine device place 1 device by intrauterine route as directed   Active, Disp: , Rfl:   •  predniSONE (DELTASONE) 50 MG tablet, , Disp: , Rfl:   •  QUEtiapine (SEROquel) 50 MG tablet, , Disp: , Rfl:      Allergies   Allergen Reactions   • Penicillins Unknown - Low Severity       Family History   Problem Relation Age of Onset   • No Known Problems Mother    • No Known Problems Father         Social History     Social History Narrative   • Not on file       Objective     Vital Signs:   Temp 97.6 °F (36.4 °C) (Temporal)   Ht 165.1 cm (65\")   Wt 65 kg (143 lb 3.2 oz)   BMI 23.83 kg/m²       Physical Exam    Ear Cerumen Removal    Date/Time: 3/15/2022 9:13 AM  Performed by: Dong Rangel MD  Authorized by: Dong Rangel MD   Location details: right ear  Procedure type: instrumentation, curved hook   Sedation:  Patient sedated: no            Constitutional   Appearance  · : well developed, well-nourished, alert and in no acute distress, voice clear and strong    Head  Inspection  · : no deformities or lesions  Face  Inspection  · : No facial lesions; House-Brackmann I/VI bilaterally  Palpation  · : No TMJ crepitus nor  muscle tenderness bilaterally    Eyes  Vision  Visual Fields  · : Extraocular movements are intact. No spontaneous or gaze-induced nystagmus.  Conjunctivae  · : clear  Sclerae  · : clear  Pupils and Irises  · : pupils equal, round, and reactive to light.     Ears, Nose, Mouth and Throat    Ears    External Ears  · : appearance within normal " limits, no lesions present  Otoscopic Examination  · : Tympanic membrane appearance within normal limits left side, severe retraction of the right eardrum with atelectasis.  Cartilage graft in place.  PE tube was partially blocked but a Torres needle was used to free cerumen and is now patent and functioning.  Ciprodex drops infused.  Hearing  · : intact to conversational voice both ears  Tunning fork testing:     :    Nose    External Nose  · : appearance normal  Intranasal Exam  · : mucosa within normal limits, vestibules normal, no intranasal lesions present, septum midline, sinuses non tender to percussion  Oral Cavity    Oral Mucosa  · : oral mucosa normal without pallor or cyanosis  Lips  · : lip appearance normal  Teeth  · : normal dentition for age  Gums  · : gums pink, non-swollen, no bleeding present  Tongue  · : tongue appearance normal; normal mobility  Palate  · : hard palate normal, soft palate appearance normal with symmetric mobility    Throat    Oropharynx  · : no inflammation or lesions present, tonsils within normal limits  Hypopharynx  · : appearance within normal limits, superior epiglottis within normal limits  Larynx  · : appearance within normal limits, vocal cords within normal limits, no lesions present    Neck  Inspection/Palpation  · : normal appearance, no masses or tenderness, trachea midline; thyroid size normal, nontender, no nodules or masses present on palpation    Respiratory  Respiratory Effort  · : breathing unlabored  Inspection of Chest  · : normal appearance, no retractions    Cardiovascular  Heart  · : regular rate and rhythm    Lymphatic  Neck  · : no lymphadenopathy present  Supraclavicular Nodes  · : no lymphadenopathy present  Preauricular Nodes  · : no lymphadenopathy present    Skin and Subcutaneous Tissue  General Inspection  · : Regarding face and neck - there are no rashes present, no lesions present, and no areas of discoloration    Neurologic  Cranial Nerves  · :  cranial nerves II-XII are grossly intact bilaterally  Gait and Station  · : normal gait, able to stand without diffculty    Psychiatric  Judgement and Insight  · : judgment and insight intact  Mood and Affect  · : mood normal, affect appropriate          Assessment and Plan    Diagnoses and all orders for this visit:    1. Cholesteatoma of right ear (Primary)    2. Dysfunction of both eustachian tubes    3. Chronic mucoid otitis media of right ear    4. Conductive hearing loss of right ear with restricted hearing of left ear    Exam today revealed continued retraction of the eardrum and PE tube that was blocked with secretions that were dried.  I was able to free the lumen of the tube by cleaning out some ceruminous debris.  I did infuse Ciprodex drops today.  I will plan to see her back in the clinic in 4 months, or sooner should there be any issues.    Follow Up   No follow-ups on file.  Patient was given instructions and counseling regarding her condition or for health maintenance advice. Please see specific information pulled into the AVS if appropriate.

## 2022-08-09 ENCOUNTER — OFFICE VISIT (OUTPATIENT)
Dept: OTOLARYNGOLOGY | Facility: CLINIC | Age: 42
End: 2022-08-09

## 2022-08-09 VITALS — BODY MASS INDEX: 23.43 KG/M2 | HEIGHT: 65 IN | TEMPERATURE: 97.2 F | WEIGHT: 140.6 LBS

## 2022-08-09 DIAGNOSIS — H71.91 CHOLESTEATOMA OF RIGHT EAR: ICD-10-CM

## 2022-08-09 DIAGNOSIS — H69.83 DYSFUNCTION OF BOTH EUSTACHIAN TUBES: ICD-10-CM

## 2022-08-09 DIAGNOSIS — H61.21 IMPACTED CERUMEN OF RIGHT EAR: ICD-10-CM

## 2022-08-09 DIAGNOSIS — H90.A11 CONDUCTIVE HEARING LOSS OF RIGHT EAR WITH RESTRICTED HEARING OF LEFT EAR: Primary | ICD-10-CM

## 2022-08-09 PROCEDURE — 99214 OFFICE O/P EST MOD 30 MIN: CPT | Performed by: OTOLARYNGOLOGY

## 2022-08-09 PROCEDURE — 69210 REMOVE IMPACTED EAR WAX UNI: CPT | Performed by: OTOLARYNGOLOGY

## 2022-08-09 NOTE — PROGRESS NOTES
Patient Name: Virgie Kim   Visit Date: 08/09/2022   Patient ID: [id number]  Provider: Dong Rangel MD    Sex: female  Location: [facility]   YOB: 1980  Location Address: [address] [place] [address], [address],?[place]?[address]    Primary Care Provider Srinivasa Ogden MD  Location Phone: [phone]    Referring Provider: No ref. provider found        Chief Complaint  Follow-up (4 month follow up ear check )    Subjective    History of Present Illness  Virgie Kim is a 42 y.o. female who presents to [facility] today as a consult from No ref. provider found.    She presents to the clinic today for follow-up regarding history of right ear cholesteatoma status post tympanomastoidectomy and ossicular chain reconstruction with a long history of eustachian tube dysfunction retraction of the eardrum as well as conductive hearing loss on the right side.  I last saw her in March, and she notes that she has been doing quite well and denies any pain or pressure issues, but continues to have the hearing loss.  We previously discussed hearing aid for this.  She denies any ear drainage.  She does have a PE tube that was placed but the eardrum is really not recovered much from the retraction although there has not been any worsening.  She does keep the ear dry.    She notes no difficulty or changes in hearing on the left side.    Past Medical History:   Diagnosis Date   • Anxiety    • Cholesteatoma    • HL (hearing loss)    • Hypothyroidism    • Impacted cerumen    • Otalgia    • Otitis media        Past Surgical History:   Procedure Laterality Date   • EXTERNAL EAR SURGERY     • LASER ABLATION     • TUBAL ABDOMINAL LIGATION           Current Outpatient Medications:   •  diazePAM (VALIUM) 5 MG tablet, , Disp: , Rfl:   •  doxycycline (VIBRAMYICN) 100 MG tablet, TAKE 1 TABLET BY MOUTH TWO TIMES A DAY FOR 10 DAYS, Disp: , Rfl:   •   "fexofenadine-pseudoephedrine (ALLEGRA-D)  MG per 12 hr tablet, Allegra-D 12 Hour  mg oral tablet extended release 12 hr take 1 tablet by oral route daily   Active, Disp: , Rfl:   •  fluconazole (DIFLUCAN) 150 MG tablet, TAKE 1 TABLET BY MOUTH FOR ONE DOSE, Disp: , Rfl:   •  FLUoxetine (PROzac) 40 MG capsule, , Disp: , Rfl:   •  levoFLOXacin (LEVAQUIN) 500 MG tablet, , Disp: , Rfl:   •  levonorgestrel (Mirena, 52 MG,) 20 MCG/24HR IUD, Mirena 20 mcg/24 hr (5 years) intrauterine intrauterine device place 1 device by intrauterine route as directed   Active, Disp: , Rfl:   •  levothyroxine (SYNTHROID, LEVOTHROID) 50 MCG tablet, , Disp: , Rfl:   •  multivitamin with minerals tablet tablet, multivitamin oral tablet take 1 tablet by oral route daily   Active, Disp: , Rfl:   •  predniSONE (DELTASONE) 50 MG tablet, , Disp: , Rfl:   •  QUEtiapine (SEROquel) 50 MG tablet, , Disp: , Rfl:   •  triamcinolone (KENALOG) 0.1 % ointment, , Disp: , Rfl:      Allergies   Allergen Reactions   • Penicillins Unknown - Low Severity       Family History   Problem Relation Age of Onset   • No Known Problems Mother    • No Known Problems Father         Social History     Social History Narrative   • Not on file       Objective     Vital Signs:   Temp 97.2 °F (36.2 °C) (Tympanic)   Ht 165.1 cm (65\")   Wt 63.8 kg (140 lb 9.6 oz)   BMI 23.40 kg/m²       Physical Exam    Ear Cerumen Removal    Date/Time: 8/9/2022 9:35 AM  Performed by: Dong Rangel MD  Authorized by: Dong Rangel MD     Anesthesia:  Local Anesthetic: none  Location details: right ear  Procedure type: instrumentation, alligator forceps, curette   Sedation:  Patient sedated: no            Constitutional   Appearance  · : well developed, well-nourished, alert and in no acute distress, voice clear and strong    Head  Inspection  · : no deformities or lesions  Face  Inspection  · : No facial lesions; House-Brackmann I/VI bilaterally  Palpation  · : No TMJ " crepitus nor  muscle tenderness bilaterally    Eyes  Vision  Visual Fields  · : Extraocular movements are intact. No spontaneous or gaze-induced nystagmus.  Conjunctivae  · : clear  Sclerae  · : clear  Pupils and Irises  · : pupils equal, round, and reactive to light.     Ears, Nose, Mouth and Throat    Ears    External Ears  · : appearance within normal limits, no lesions present  Otoscopic Examination  · : Tympanic membrane appearance with slight retraction on the left, visible incudostapedial joint.  Right ear with significant retraction and visible prosthesis and cartilage graft.  Tiny titanium tube partially blocked, and removed today with alligator forceps along with some surrounding cerumen.  There is still a small perforation in the middle ear appears to be dry normal.  Hearing  · : intact to conversational voice both ears  Tunning fork testing:     :    Nose    External Nose  · : appearance normal  Intranasal Exam  · : mucosa within normal limits, vestibules normal, no intranasal lesions present, septum midline, sinuses non tender to percussion  Oral Cavity    Oral Mucosa  · : oral mucosa normal without pallor or cyanosis  Lips  · : lip appearance normal  Teeth  · : normal dentition for age  Gums  · : gums pink, non-swollen, no bleeding present  Tongue  · : tongue appearance normal; normal mobility  Palate  · : hard palate normal, soft palate appearance normal with symmetric mobility    Throat    Oropharynx  · : no inflammation or lesions present, tonsils within normal limits  Hypopharynx  · : appearance within normal limits, superior epiglottis within normal limits  Larynx  · : appearance within normal limits, vocal cords within normal limits, no lesions present    Neck  Inspection/Palpation  · : normal appearance, no masses or tenderness, trachea midline; thyroid size normal, nontender, no nodules or masses present on palpation    Respiratory  Respiratory Effort  · : breathing  unlabored  Inspection of Chest  · : normal appearance, no retractions    Cardiovascular  Heart  · : regular rate and rhythm    Lymphatic  Neck  · : no lymphadenopathy present  Supraclavicular Nodes  · : no lymphadenopathy present  Preauricular Nodes  · : no lymphadenopathy present    Skin and Subcutaneous Tissue  General Inspection  · : Regarding face and neck - there are no rashes present, no lesions present, and no areas of discoloration    Neurologic  Cranial Nerves  · : cranial nerves II-XII are grossly intact bilaterally  Gait and Station  · : normal gait, able to stand without diffculty    Psychiatric  Judgement and Insight  · : judgment and insight intact  Mood and Affect  · : mood normal, affect appropriate          Assessment and Plan    Diagnoses and all orders for this visit:    1. Conductive hearing loss of right ear with restricted hearing of left ear (Primary)    2. Dysfunction of both eustachian tubes    3. Cholesteatoma of right ear    4. Impacted cerumen of right ear    Examination today revealed the PE tube on the right side to be partially blocked.  There was a cerumen impaction on the right as well that was removed.  Based on the appearance of the ear tube we elected to remove the tube to allow the eardrum to heal as it looks like his head in the process of extruding.  She tolerated the procedure well in the clinic.  I will allow the eardrum to heal over the next 3 to 4 months, and we will see her back in the clinic to reassess the ear.  I have advised her to use a hearing aid for this year, and she is interested.  I will have her my audiologist discuss this further and have given her contact information.  She understands to keep the ear dry.    Follow Up   No follow-ups on file.  Patient was given instructions and counseling regarding her condition or for health maintenance advice. Please see specific information pulled into the AVS if appropriate.

## 2022-12-13 ENCOUNTER — OFFICE VISIT (OUTPATIENT)
Dept: OTOLARYNGOLOGY | Facility: CLINIC | Age: 42
End: 2022-12-13

## 2022-12-13 VITALS — TEMPERATURE: 98.2 F | HEIGHT: 65 IN | BODY MASS INDEX: 22.82 KG/M2 | WEIGHT: 137 LBS

## 2022-12-13 DIAGNOSIS — H90.A11 CONDUCTIVE HEARING LOSS OF RIGHT EAR WITH RESTRICTED HEARING OF LEFT EAR: ICD-10-CM

## 2022-12-13 DIAGNOSIS — H65.31 CHRONIC MUCOID OTITIS MEDIA OF RIGHT EAR: ICD-10-CM

## 2022-12-13 DIAGNOSIS — H71.91 CHOLESTEATOMA OF RIGHT EAR: Primary | ICD-10-CM

## 2022-12-13 DIAGNOSIS — H69.83 DYSFUNCTION OF BOTH EUSTACHIAN TUBES: ICD-10-CM

## 2022-12-13 PROCEDURE — 99214 OFFICE O/P EST MOD 30 MIN: CPT | Performed by: OTOLARYNGOLOGY

## 2022-12-13 RX ORDER — METRONIDAZOLE 500 MG/1
TABLET ORAL
COMMUNITY
Start: 2022-11-17

## 2022-12-13 NOTE — PROGRESS NOTES
Patient Name: Virgie Kim   Visit Date: 12/13/2022   Patient ID: [id number]  Provider: Dong Rangel MD    Sex: female  Location: [facility]   YOB: 1980  Location Address: [address] [place] [address], [address] [place] [address],?[place]?[id number]    Primary Care Provider Srinivasa Ogden MD  Location Phone: [phone]    Referring Provider: No ref. provider found        Chief Complaint  Follow-up (4 month ETD  wax, hearing loss cholesteatoma )    Subjective    History of Present Illness  Virgie Kim is a 42 y.o. female who presents to [facility] today as a consult from No ref. provider found.    She presents to the clinic today for follow-up regarding her ears and hearing.  She informs me that she has been doing well overall, but continues to have difficulty with conductive hearing loss on the right side.  She has had no change in hearing on the left, and overall does well.  I last saw her in August, and she notes that her ears have been stable.  We previously discussed hearing aid for the right ear, but she has not discussed this yet with the audiologist.  She does have a history of mastoidectomy followed by a long complex surgery performed in [place] which took 11 hours and included ossicular chain reconstruction.  She notes that her hearing decreased after the second surgery.    Past Medical History:   Diagnosis Date   • Anxiety    • Cholesteatoma    • ETD (eustachian tube dysfunction)    • HL (hearing loss)    • Hypothyroidism    • Impacted cerumen    • Otalgia    • Otitis media        Past Surgical History:   Procedure Laterality Date   • EXTERNAL EAR SURGERY     • LASER ABLATION     • TUBAL ABDOMINAL LIGATION           Current Outpatient Medications:   •  triamcinolone (KENALOG) 0.1 % ointment, , Disp: , Rfl:   •  diazePAM (VALIUM) 5 MG tablet, , Disp: , Rfl:   •  doxycycline (VIBRAMYICN) 100 MG tablet, TAKE 1 TABLET BY MOUTH TWO  "TIMES A DAY FOR 10 DAYS, Disp: , Rfl:   •  fexofenadine-pseudoephedrine (ALLEGRA-D)  MG per 12 hr tablet, Allegra-D 12 Hour  mg oral tablet extended release 12 hr take 1 tablet by oral route daily   Active, Disp: , Rfl:   •  fluconazole (DIFLUCAN) 150 MG tablet, TAKE 1 TABLET BY MOUTH FOR ONE DOSE, Disp: , Rfl:   •  FLUoxetine (PROzac) 40 MG capsule, , Disp: , Rfl:   •  levoFLOXacin (LEVAQUIN) 500 MG tablet, , Disp: , Rfl:   •  levonorgestrel (Mirena, 52 MG,) 20 MCG/24HR IUD, Mirena 20 mcg/24 hr (5 years) intrauterine intrauterine device place 1 device by intrauterine route as directed   Active, Disp: , Rfl:   •  levothyroxine (SYNTHROID, LEVOTHROID) 50 MCG tablet, , Disp: , Rfl:   •  metroNIDAZOLE (FLAGYL) 500 MG tablet, , Disp: , Rfl:   •  multivitamin with minerals tablet tablet, multivitamin oral tablet take 1 tablet by oral route daily   Active, Disp: , Rfl:   •  predniSONE (DELTASONE) 50 MG tablet, , Disp: , Rfl:   •  QUEtiapine (SEROquel) 50 MG tablet, , Disp: , Rfl:      Allergies   Allergen Reactions   • Penicillins Unknown - Low Severity       Family History   Problem Relation Age of Onset   • No Known Problems Mother    • No Known Problems Father         Social History     Social History Narrative   • Not on file       Objective     Vital Signs:   Temp 98.2 °F (36.8 °C) (Tympanic)   Ht 165.1 cm (65\")   Wt 62.1 kg (137 lb)   BMI 22.80 kg/m²       Physical Exam         Constitutional   Appearance  · : well developed, well-nourished, alert and in no acute distress, voice clear and strong    Head  Inspection  · : no deformities or lesions  Face  Inspection  · : No facial lesions; House-Brackmann I/VI bilaterally  Palpation  · : No TMJ crepitus nor  muscle tenderness bilaterally    Eyes  Vision  Visual Fields  · : Extraocular movements are intact. No spontaneous or gaze-induced nystagmus.  Conjunctivae  · : clear  Sclerae  · : clear  Pupils and Irises  · : pupils equal, round, and " reactive to light.     Ears, Nose, Mouth and Throat    Ears    External Ears  · : appearance within normal limits, no lesions present  Otoscopic Examination  · : Tympanic membrane appearance within normal limits on the left, stable appearance on the right with cartilage graft and what appears to be a prosthesis in stable position.  Mild retraction of the eardrums bilaterally, especially the right.  Well aerated middle ears.  Hearing  · : intact to conversational voice both ears  Tunning fork testing:     :    Nose    External Nose  · : appearance normal  Intranasal Exam  · : mucosa within normal limits, vestibules normal, no intranasal lesions present, septum midline, sinuses non tender to percussion  Oral Cavity    Oral Mucosa  · : oral mucosa normal without pallor or cyanosis  Lips  · : lip appearance normal  Teeth  · : normal dentition for age  Gums  · : gums pink, non-swollen, no bleeding present  Tongue  · : tongue appearance normal; normal mobility  Palate  · : hard palate normal, soft palate appearance normal with symmetric mobility    Throat    Oropharynx  · : no inflammation or lesions present, tonsils within normal limits  Hypopharynx  · : appearance within normal limits, superior epiglottis within normal limits  Larynx  · : appearance within normal limits, vocal cords within normal limits, no lesions present    Neck  Inspection/Palpation  · : normal appearance, no masses or tenderness, trachea midline; thyroid size normal, nontender, no nodules or masses present on palpation    Respiratory  Respiratory Effort  · : breathing unlabored  Inspection of Chest  · : normal appearance, no retractions    Cardiovascular  Heart  · : regular rate and rhythm    Lymphatic  Neck  · : no lymphadenopathy present  Supraclavicular Nodes  · : no lymphadenopathy present  Preauricular Nodes  · : no lymphadenopathy present    Skin and Subcutaneous Tissue  General Inspection  · : Regarding face and neck - there are no rashes  present, no lesions present, and no areas of discoloration    Neurologic  Cranial Nerves  · : cranial nerves II-XII are grossly intact bilaterally  Gait and Station  · : normal gait, able to stand without diffculty    Psychiatric  Judgement and Insight  · : judgment and insight intact  Mood and Affect  · : mood normal, affect appropriate          Assessment and Plan    Diagnoses and all orders for this visit:    1. Cholesteatoma of right ear (Primary)    2. Dysfunction of both eustachian tubes    3. Chronic mucoid otitis media of right ear    4. Conductive hearing loss of right ear with restricted hearing of left ear    Examination today revealed stable appearing ears with retraction of the eardrums and no evidence of fluid or infection.  I discussed hearing loss of the right ear and options include hearing aid versus middle ear exploration.  Given the complexity of her second surgery I have asked that she have her previous operative report sent to me for review.  I have given her contact information for our audiologist and I think a hearing aid trial would be the best first course of action.  I will see her back in the clinic in about 6 months, or sooner should there be any issues.    Follow Up   No follow-ups on file.  Patient was given instructions and counseling regarding her condition or for health maintenance advice. Please see specific information pulled into the AVS if appropriate.

## 2023-03-13 ENCOUNTER — OFFICE VISIT (OUTPATIENT)
Dept: OBSTETRICS AND GYNECOLOGY | Facility: CLINIC | Age: 43
End: 2023-03-13
Payer: COMMERCIAL

## 2023-03-13 VITALS
SYSTOLIC BLOOD PRESSURE: 140 MMHG | HEART RATE: 98 BPM | HEIGHT: 65 IN | BODY MASS INDEX: 21.83 KG/M2 | DIASTOLIC BLOOD PRESSURE: 78 MMHG | WEIGHT: 131 LBS

## 2023-03-13 DIAGNOSIS — Z01.419 ENCOUNTER FOR GYNECOLOGICAL EXAMINATION WITHOUT ABNORMAL FINDING: Primary | ICD-10-CM

## 2023-03-13 DIAGNOSIS — Z11.3 SCREEN FOR STD (SEXUALLY TRANSMITTED DISEASE): ICD-10-CM

## 2023-03-13 DIAGNOSIS — Z80.9 INCREASED RISK FOR HEREDITARY CANCER SYNDROME: ICD-10-CM

## 2023-03-13 DIAGNOSIS — Z91.89 INCREASED RISK FOR HEREDITARY CANCER SYNDROME: ICD-10-CM

## 2023-03-13 LAB
HIV1+2 AB SER QL: NORMAL
T PALLIDUM IGG SER QL: NORMAL

## 2023-03-13 PROCEDURE — 87591 N.GONORRHOEAE DNA AMP PROB: CPT | Performed by: OBSTETRICS & GYNECOLOGY

## 2023-03-13 PROCEDURE — G0123 SCREEN CERV/VAG THIN LAYER: HCPCS | Performed by: OBSTETRICS & GYNECOLOGY

## 2023-03-13 PROCEDURE — G0432 EIA HIV-1/HIV-2 SCREEN: HCPCS | Performed by: OBSTETRICS & GYNECOLOGY

## 2023-03-13 PROCEDURE — 99396 PREV VISIT EST AGE 40-64: CPT | Performed by: OBSTETRICS & GYNECOLOGY

## 2023-03-13 PROCEDURE — 36415 COLL VENOUS BLD VENIPUNCTURE: CPT | Performed by: OBSTETRICS & GYNECOLOGY

## 2023-03-13 PROCEDURE — 87491 CHLMYD TRACH DNA AMP PROBE: CPT | Performed by: OBSTETRICS & GYNECOLOGY

## 2023-03-13 PROCEDURE — 87661 TRICHOMONAS VAGINALIS AMPLIF: CPT | Performed by: OBSTETRICS & GYNECOLOGY

## 2023-03-13 PROCEDURE — 86780 TREPONEMA PALLIDUM: CPT | Performed by: OBSTETRICS & GYNECOLOGY

## 2023-03-13 PROCEDURE — 87624 HPV HI-RISK TYP POOLED RSLT: CPT | Performed by: OBSTETRICS & GYNECOLOGY

## 2023-03-13 PROCEDURE — 87340 HEPATITIS B SURFACE AG IA: CPT | Performed by: OBSTETRICS & GYNECOLOGY

## 2023-03-13 PROCEDURE — 86803 HEPATITIS C AB TEST: CPT | Performed by: OBSTETRICS & GYNECOLOGY

## 2023-03-13 NOTE — PROGRESS NOTES
"Well Woman Visit    CC: Annual well woman exam       HPI:   42 y.o. Contraception or HRT: Contraception:  Bilateral salpingectomy  Menses:   q mon, lasts 7 days, changes products q 6hrs on heaviest days, states last two menses have each been several days late  Pain:  None  Incontinence concerns: No  Hx of abnormal pap:  Yes  Pt has no complaints today. desires full std testing including hpv. Denies sx.      History: PMHx, Meds, Allergies, PSHx, Social Hx, and POBHx all reviewed and updated.      PHYSICAL EXAM:  /78   Pulse 98   Ht 165.1 cm (65\")   Wt 59.4 kg (131 lb)   LMP 2023 (Approximate)   BMI 21.80 kg/m²   General- NAD, alert and oriented, appropriate  Psych- Normal mood, good memory  Neck- No masses, no thyroid enlargement  CV- Regular rhythm, no murnurs  Resp- CTA to bases, no wheezes  Abdomen- Soft, non distended, non tender, no masses    Breast left-  Bilaterally symmetrical, no masses, non tender, no nipple discharge  Breast right- Bilaterally symmetrical, no masses, non tender, no nipple discharge    External genitalia- Normal female, no lesions  Urethra/meatus- Normal, no masses, non tender, no prolapse  Bladder- Normal, no masses, non tender, no prolapse  Vagina- Normal, no atrophy, no lesions, no discharge, no prolapse  Cvx- Normal, no lesions, no discharge, No cervical motion tenderness  Uterus- Normal size, shape & consistency.  Non tender, mobile, & no prolapse  Adnexa- No mass, non tender  Anus/Rectum/Perineum- Not performed    Lymphatic- No palpable neck, axillary, or groin nodes  Ext- No edema, no cyanosis    Skin- No lesions, no rashes, no acanthosis nigricans        ASSESSMENT and PLAN:  WWE    Diagnoses and all orders for this visit:    1. Encounter for gynecological examination without abnormal finding (Primary)  -     Mammo Screening Digital Tomosynthesis Bilateral With CAD; Future  -     IGP,CtNgTv,Apt HPV    2. Screen for STD (sexually transmitted disease)  -     " IGP,CtNgTv,Apt HPV  -     Hepatitis B Surface Antigen  -     Hepatitis C Antibody  -     HIV-1 & HIV-2 Antibodies  -     T Pallidum Antibody (FTA-Ab)    3. Increased risk for hereditary cancer syndrome  -     [facility] [[email or web address]] Hereditary Cancer Screen        Counseling:     Track menses, RTO IF <q21d, >7d long, or heavy    Domestic violence/abuse screen: negative    Depression screen: no SI    Preventative:   BREAST HEALTH- Monthly self breast exam importance and how to reviewed. MMG and/or MRI (prn) reviewed per society guidelines and her individual history. Mammo/MRI screen: Updated today.  CERVICAL CANCER Screening- Reviewed current ASCCP guidelines for screening w and wo cotest HPV, age specific.  Screen: Updated today.  COLON CANCER Screening- Reviewed current medical society guidelines and options.  Colonoscopy screen:  Not medically needed.  SEXUAL HEALTH: STD screening desired.  Ordered.  VACCINATIONS Recommended: Flu annually, Gardisil/HPV vaccine (up to 46yo).  Importance discussed, risk being unvaccinated reviewed.  Questions answered  Smoking status- NON SMOKER.  Importance of avoiding second hand smoke.  Follow up PCP/Specialist PMHx and Labs  [place]: Qualifies for testing. Counseled and evaluated for genetic testing. Testing accepted.      She understands the importance of having any ordered tests to be performed in a timely fashion.  She is encouraged to review her results online and/or contact or office if she has questions.     Follow Up:  Return in about 6 weeks (around 4/24/2023) for [employer] f/u.      Ann Don, LOUISE  03/13/2023

## 2023-03-13 NOTE — PROGRESS NOTES
Venipuncture Blood Specimen Collection  Venipuncture performed in right arm by Aashish Newsome MA with good hemostasis. Patient tolerated procedure well   03/13/23   Aashish Newsome MA

## 2023-03-14 ENCOUNTER — OFFICE VISIT (OUTPATIENT)
Dept: OBSTETRICS AND GYNECOLOGY | Facility: CLINIC | Age: 43
End: 2023-03-14
Payer: COMMERCIAL

## 2023-03-14 VITALS
BODY MASS INDEX: 21.66 KG/M2 | DIASTOLIC BLOOD PRESSURE: 71 MMHG | WEIGHT: 130 LBS | HEART RATE: 86 BPM | HEIGHT: 65 IN | SYSTOLIC BLOOD PRESSURE: 121 MMHG

## 2023-03-14 DIAGNOSIS — R63.4 WEIGHT LOSS: ICD-10-CM

## 2023-03-14 DIAGNOSIS — N93.9 ABNORMAL UTERINE BLEEDING: Primary | ICD-10-CM

## 2023-03-14 LAB
DEPRECATED RDW RBC AUTO: 42.6 FL (ref 37–54)
ERYTHROCYTE [DISTWIDTH] IN BLOOD BY AUTOMATED COUNT: 12.5 % (ref 12.3–15.4)
FSH SERPL-ACNC: 4.62 MIU/ML
HBV SURFACE AG SERPL QL IA: NORMAL
HCT VFR BLD AUTO: 43.5 % (ref 34–46.6)
HCV AB SER DONR QL: NORMAL
HGB BLD-MCNC: 14.7 G/DL (ref 12–15.9)
MCH RBC QN AUTO: 31.9 PG (ref 26.6–33)
MCHC RBC AUTO-ENTMCNC: 33.8 G/DL (ref 31.5–35.7)
MCV RBC AUTO: 94.4 FL (ref 79–97)
PLATELET # BLD AUTO: 229 10*3/MM3 (ref 140–450)
PMV BLD AUTO: 11.9 FL (ref 6–12)
PROLACTIN SERPL-MCNC: 23.8 NG/ML (ref 4.79–23.3)
RBC # BLD AUTO: 4.61 10*6/MM3 (ref 3.77–5.28)
T4 FREE SERPL-MCNC: 1.2 NG/DL (ref 0.93–1.7)
TSH SERPL DL<=0.05 MIU/L-ACNC: 3.19 UIU/ML (ref 0.27–4.2)
WBC NRBC COR # BLD: 7.09 10*3/MM3 (ref 3.4–10.8)

## 2023-03-14 PROCEDURE — 84443 ASSAY THYROID STIM HORMONE: CPT | Performed by: OBSTETRICS & GYNECOLOGY

## 2023-03-14 PROCEDURE — 83001 ASSAY OF GONADOTROPIN (FSH): CPT | Performed by: OBSTETRICS & GYNECOLOGY

## 2023-03-14 PROCEDURE — 99214 OFFICE O/P EST MOD 30 MIN: CPT | Performed by: OBSTETRICS & GYNECOLOGY

## 2023-03-14 PROCEDURE — 85027 COMPLETE CBC AUTOMATED: CPT | Performed by: OBSTETRICS & GYNECOLOGY

## 2023-03-14 PROCEDURE — 84146 ASSAY OF PROLACTIN: CPT | Performed by: OBSTETRICS & GYNECOLOGY

## 2023-03-14 PROCEDURE — 84439 ASSAY OF FREE THYROXINE: CPT | Performed by: OBSTETRICS & GYNECOLOGY

## 2023-03-14 NOTE — PROGRESS NOTES
Venipuncture Blood Specimen Collection  Venipuncture performed in Left arm by Aashish Newsome MA with good hemostasis. Patient tolerated procedure well   03/14/23   Aashish Newsome MA

## 2023-03-17 LAB
C TRACH RRNA CVX QL NAA+PROBE: NEGATIVE
CYTOLOGIST CVX/VAG CYTO: NORMAL
CYTOLOGY CVX/VAG DOC CYTO: NORMAL
CYTOLOGY CVX/VAG DOC THIN PREP: NORMAL
DX ICD CODE: NORMAL
HIV 1 & 2 AB SER-IMP: NORMAL
HPV I/H RISK 4 DNA CVX QL PROBE+SIG AMP: NEGATIVE
N GONORRHOEA RRNA CVX QL NAA+PROBE: NEGATIVE
OTHER STN SPEC: NORMAL
STAT OF ADQ CVX/VAG CYTO-IMP: NORMAL
T VAGINALIS RRNA SPEC QL NAA+PROBE: NEGATIVE

## 2023-03-27 ENCOUNTER — TELEPHONE (OUTPATIENT)
Dept: OBSTETRICS AND GYNECOLOGY | Facility: CLINIC | Age: 43
End: 2023-03-27
Payer: COMMERCIAL

## 2023-03-27 LAB — REF LAB TEST METHOD: NORMAL

## 2023-03-27 NOTE — TELEPHONE ENCOUNTER
Patient called regarding test results, and if any further recommendations due to prolactin being mildly elevated. Please advise.

## 2023-04-25 ENCOUNTER — OFFICE VISIT (OUTPATIENT)
Dept: OBSTETRICS AND GYNECOLOGY | Facility: CLINIC | Age: 43
End: 2023-04-25
Payer: COMMERCIAL

## 2023-04-25 VITALS
HEART RATE: 83 BPM | DIASTOLIC BLOOD PRESSURE: 84 MMHG | HEIGHT: 65 IN | SYSTOLIC BLOOD PRESSURE: 120 MMHG | BODY MASS INDEX: 21.63 KG/M2

## 2023-04-25 DIAGNOSIS — Z71.83 ENCOUNTER FOR NONPROCREATIVE GENETIC COUNSELING: Primary | ICD-10-CM

## 2023-04-25 DIAGNOSIS — Z91.89 INCREASED RISK OF BREAST CANCER: ICD-10-CM

## 2023-04-25 NOTE — PROGRESS NOTES
"GYN Problem/Follow Up Visit    Chief Complaint   Patient presents with   • [facility] F/U           HPI  Virgie Kim is a 42 y.o. female, , who presents for genetic test results. fam hx of breast and colon cancer. No new concerns today.       Additional OB/GYN History   No LMP recorded.  Current contraception: contraceptive methods: salpingectomy  Desires to: continue contraception  Allergies : Penicillins     The additional following portions of the patient's history were reviewed and updated as appropriate: allergies, current medications, past family history, past medical history, past social history, past surgical history and problem list.    Review of Systems    I have reviewed and agree with the HPI, ROS, and historical information as entered above. Ann Don, APRN    Objective   /84   Pulse 83   Ht 165.1 cm (65\")   BMI 21.63 kg/m²     Physical Exam  Vitals reviewed.   Neurological:      Mental Status: She is alert and oriented to person, place, and time.            Assessment and Plan    Diagnoses and all orders for this visit:    1. Encounter for nonprocreative genetic counseling (Primary)    2. Increased risk of breast cancer    reviewed genetic testing done 3/13/23: negative for mutations. Risk score 33%. No VUS. Discussed elevated risk for breast and colon cancer and recommendations. Continue monthly and prn sbe and yearly clinical breast exams. Has appt for mammo. Declines breast mri for now. Will consider and let the office know. Ov for any concerns. Has had colonoscopy. rto prn. Copy given and all questions answered. I spent 20 minutes discussing results and plan with the pt.     Counseling:  She understands the importance of having the above orders performed in a timely fashion.  She is encouraged to review her results online and/or contact or office if she has questions.     Follow Up:  Return if symptoms worsen or fail to improve.      Ann Don, APRN  2023  "

## 2023-06-13 ENCOUNTER — OFFICE VISIT (OUTPATIENT)
Dept: OTOLARYNGOLOGY | Facility: CLINIC | Age: 43
End: 2023-06-13
Payer: COMMERCIAL

## 2023-06-13 VITALS
SYSTOLIC BLOOD PRESSURE: 132 MMHG | HEART RATE: 72 BPM | WEIGHT: 135.6 LBS | HEIGHT: 65 IN | TEMPERATURE: 97.8 F | BODY MASS INDEX: 22.59 KG/M2 | DIASTOLIC BLOOD PRESSURE: 80 MMHG

## 2023-06-13 DIAGNOSIS — H69.83 DYSFUNCTION OF BOTH EUSTACHIAN TUBES: ICD-10-CM

## 2023-06-13 DIAGNOSIS — H71.91 CHOLESTEATOMA OF RIGHT EAR: Primary | ICD-10-CM

## 2023-06-13 DIAGNOSIS — H61.21 IMPACTED CERUMEN OF RIGHT EAR: ICD-10-CM

## 2023-06-13 DIAGNOSIS — H90.A11 CONDUCTIVE HEARING LOSS OF RIGHT EAR WITH RESTRICTED HEARING OF LEFT EAR: ICD-10-CM

## 2023-06-13 PROCEDURE — 69210 REMOVE IMPACTED EAR WAX UNI: CPT | Performed by: OTOLARYNGOLOGY

## 2023-06-13 PROCEDURE — 99214 OFFICE O/P EST MOD 30 MIN: CPT | Performed by: OTOLARYNGOLOGY

## 2023-06-13 NOTE — PROGRESS NOTES
Patient Name: Virgie Kim   Visit Date: 06/13/2023   Patient ID: [id number]  Provider: Dong Rangel MD    Sex: female  Location: [facility]   YOB: 1980  Location Address: [address] [place] [address], [address] [place] [address],?[place]?[id number]    Primary Care Provider Srinivasa Ogden MD  Location Phone: [phone]    Referring Provider: No ref. provider found        Chief Complaint  Follow-up (6 month ear check)    Subjective    History of Present Illness  Virgie Kim is a 42 y.o. female who presents to [facility] today as a consult from No ref. provider found.    She presents to the clinic today for follow-up regarding her ears and hearing.  She has been doing well, and has not had any major issues since last time I saw her in December.  She did obtain hearing aids and uses them at work.  She has had some issues with the tuning of the hearing aid but has not had a chance to see the audiologist about this yet.  Denies any ear pain or drainage symptoms.  She previously had a PE tube on the right side that has since extruded.  Has had some issues with cerumen.    Past Medical History:   Diagnosis Date   • Abnormal Pap smear of cervix    • Anxiety    • Cholesteatoma    • ETD (eustachian tube dysfunction)    • HL (hearing loss)    • HPV (human papilloma virus) infection    • Hypothyroidism    • Impacted cerumen    • Otalgia    • Otitis media        Past Surgical History:   Procedure Laterality Date   • EXTERNAL EAR SURGERY     • LASER ABLATION     • SALPINGECTOMY Bilateral          Current Outpatient Medications:   •  diazePAM (VALIUM) 5 MG tablet, , Disp: , Rfl:   •  fluconazole (DIFLUCAN) 150 MG tablet, TAKE 1 TABLET BY MOUTH FOR ONE DOSE (Patient not taking: Reported on 6/13/2023), Disp: , Rfl:   •  levoFLOXacin (LEVAQUIN) 500 MG tablet, , Disp: , Rfl:   •  metroNIDAZOLE (FLAGYL) 500 MG tablet, , Disp: , Rfl:   •  multivitamin with  "minerals tablet tablet, multivitamin oral tablet take 1 tablet by oral route daily   Active, Disp: , Rfl:   •  QUEtiapine (SEROquel) 50 MG tablet, , Disp: , Rfl:   •  triamcinolone (KENALOG) 0.1 % ointment, , Disp: , Rfl:      Allergies   Allergen Reactions   • Penicillins Anaphylaxis       Family History   Problem Relation Age of Onset   • No Known Problems Father    • No Known Problems Mother    • Lung cancer Maternal Grandfather    • Breast cancer Maternal Aunt    • Colon cancer Maternal Uncle    • Ovarian cancer Neg Hx    • Uterine cancer Neg Hx         Social History     Social History Narrative   • Not on file       Objective     Vital Signs:   /80 (BP Location: Left arm, Patient Position: Sitting, Cuff Size: Adult)   Pulse 72   Temp 97.8 °F (36.6 °C) (Tympanic)   Ht 165.1 cm (65\")   Wt 61.5 kg (135 lb 9.6 oz)   BMI 22.57 kg/m²       Physical Exam    Ear Cerumen Removal    Date/Time: 6/13/2023 10:06 AM  Performed by: Dong Rangel MD  Authorized by: Dong Rangel MD     Anesthesia:  Local Anesthetic: none  Location details: right ear  Procedure type: instrumentation, curette   Sedation:  Patient sedated: no            Constitutional   Appearance  · : well developed, well-nourished, alert and in no acute distress, voice clear and strong    Head  Inspection  · : no deformities or lesions  Face  Inspection  · : No facial lesions; House-Brackmann I/VI bilaterally  Palpation  · : No TMJ crepitus nor  muscle tenderness bilaterally    Eyes  Vision  Visual Fields  · : Extraocular movements are intact. No spontaneous or gaze-induced nystagmus.  Conjunctivae  · : clear  Sclerae  · : clear  Pupils and Irises  · : pupils equal, round, and reactive to light.     Ears, Nose, Mouth and Throat    Ears    External Ears  · : appearance within normal limits, no lesions present  Otoscopic Examination  · : Cerumen impacted on the right, removed with curettes and microscope, tympanic membrane " appearance with myringosclerosis bilaterally and retraction on the right, well-aerated middle ears  Hearing  · : intact to conversational voice both ears  Tunning fork testing:     :    Nose    External Nose  · : appearance normal  Intranasal Exam  · : mucosa within normal limits, vestibules normal, no intranasal lesions present, septum midline, sinuses non tender to percussion  Oral Cavity    Oral Mucosa  · : oral mucosa normal without pallor or cyanosis  Lips  · : lip appearance normal  Teeth  · : normal dentition for age  Gums  · : gums pink, non-swollen, no bleeding present  Tongue  · : tongue appearance normal; normal mobility  Palate  · : hard palate normal, soft palate appearance normal with symmetric mobility    Throat    Oropharynx  · : no inflammation or lesions present, tonsils within normal limits  Hypopharynx  · : appearance within normal limits, superior epiglottis within normal limits  Larynx  · : appearance within normal limits, vocal cords within normal limits, no lesions present    Neck  Inspection/Palpation  · : normal appearance, no masses or tenderness, trachea midline; thyroid size normal, nontender, no nodules or masses present on palpation    Respiratory  Respiratory Effort  · : breathing unlabored  Inspection of Chest  · : normal appearance, no retractions    Cardiovascular  Heart  · : regular rate and rhythm    Lymphatic  Neck  · : no lymphadenopathy present  Supraclavicular Nodes  · : no lymphadenopathy present  Preauricular Nodes  · : no lymphadenopathy present    Skin and Subcutaneous Tissue  General Inspection  · : Regarding face and neck - there are no rashes present, no lesions present, and no areas of discoloration    Neurologic  Cranial Nerves  · : cranial nerves II-XII are grossly intact bilaterally  Gait and Station  · : normal gait, able to stand without diffculty    Psychiatric  Judgement and Insight  · : judgment and insight intact  Mood and Affect  · : mood normal, affect  appropriate          Assessment and Plan    Diagnoses and all orders for this visit:    1. Cholesteatoma of right ear (Primary)    2. Dysfunction of both eustachian tubes    3. Conductive hearing loss of right ear with restricted hearing of left ear    4. Impacted cerumen of right ear    Other orders  -     Ear Cerumen Removal    Examination today revealed stable ear exam with retraction on the right side and myringosclerosis bilaterally.  Middle ears appear well aerated.  Possible small perforation where the PE tube used to be on the right side.  No evidence of middle ear inflammation or drainage.  No evidence of cholesteatoma.  I will plan on having her back in the clinic in 6 months to reassess her ears, or sooner should there be any issues.  We discussed dry ear precautions for the right ear.  I have advised her to visit the audiologist to have her hearing aids recalibrated.    Follow Up   No follow-ups on file.  Patient was given instructions and counseling regarding her condition or for health maintenance advice. Please see specific information pulled into the AVS if appropriate.

## 2023-12-12 ENCOUNTER — OFFICE VISIT (OUTPATIENT)
Dept: OTOLARYNGOLOGY | Facility: CLINIC | Age: 43
End: 2023-12-12
Payer: COMMERCIAL

## 2023-12-12 VITALS — TEMPERATURE: 97.4 F | BODY MASS INDEX: 21.23 KG/M2 | WEIGHT: 127.4 LBS | HEIGHT: 65 IN

## 2023-12-12 DIAGNOSIS — H69.93 DYSFUNCTION OF BOTH EUSTACHIAN TUBES: ICD-10-CM

## 2023-12-12 DIAGNOSIS — H90.A11 CONDUCTIVE HEARING LOSS OF RIGHT EAR WITH RESTRICTED HEARING OF LEFT EAR: ICD-10-CM

## 2023-12-12 DIAGNOSIS — H71.91 CHOLESTEATOMA OF RIGHT EAR: Primary | ICD-10-CM

## 2023-12-12 DIAGNOSIS — H65.31 CHRONIC MUCOID OTITIS MEDIA OF RIGHT EAR: ICD-10-CM

## 2023-12-12 DIAGNOSIS — H61.21 IMPACTED CERUMEN OF RIGHT EAR: ICD-10-CM

## 2023-12-12 PROCEDURE — 99214 OFFICE O/P EST MOD 30 MIN: CPT | Performed by: OTOLARYNGOLOGY

## 2023-12-12 PROCEDURE — 69210 REMOVE IMPACTED EAR WAX UNI: CPT | Performed by: OTOLARYNGOLOGY

## 2023-12-12 RX ORDER — METHOCARBAMOL 750 MG/1
750 TABLET, FILM COATED ORAL 4 TIMES DAILY
COMMUNITY
Start: 2023-10-11

## 2023-12-12 RX ORDER — IBUPROFEN 800 MG/1
800 TABLET ORAL
COMMUNITY
Start: 2023-10-11

## 2023-12-12 NOTE — PROGRESS NOTES
Patient Name: Virgie Kim   Visit Date: 12/12/2023   Patient ID: [id number]  Provider: Dong Rangel MD    Sex: female  Location: [facility] [place] [facility]   YOB: 1980  Location Address: [address] [place] [address], [address] [place] [address],?[place]?[id number]    Primary Care Provider Srinivasa Ogden MD  Location Phone: [phone]    Referring Provider: No ref. provider found        Chief Complaint  Follow-up (6 month cholesteatoma of right ear ), Hearing Loss, and Cerumen Impaction (ETD)    Subjective    History of Present Illness  Virgie Kim is a 43 y.o. female who presents to [facility] today as a consult from No ref. provider found.    She presents to the clinic today for follow-up regarding her ears and hearing.  For the most part she has been doing well since the last time I saw her in the clinic in June.  She has had no ear drainage.  She notes some fullness on the right side and decreased hearing.  Has had no difficulty with her left ear.  She informs me that she initially was using her hearing aids but did not go back to have them adjusted and has not been using them.    She did have a PE tube placed on the right side and when that extruded it resulted in a small perforation.  She has previously had cholesteatoma and acicular chain reconstruction many years ago.    Past Medical History:   Diagnosis Date    Abnormal Pap smear of cervix     Anxiety     Cholesteatoma     ETD (eustachian tube dysfunction)     HL (hearing loss)     HPV (human papilloma virus) infection     Hypothyroidism     Impacted cerumen     Otalgia     Otitis media        Past Surgical History:   Procedure Laterality Date    EXTERNAL EAR SURGERY      LASER ABLATION      SALPINGECTOMY Bilateral          Current Outpatient Medications:     diazePAM (VALIUM) 5 MG tablet, , Disp: , Rfl:     ibuprofen (ADVIL,MOTRIN) 800 MG tablet, Take 1 tablet by mouth., Disp: , Rfl:      "methocarbamol (ROBAXIN) 750 MG tablet, Take 1 tablet by mouth 4 (Four) Times a Day., Disp: , Rfl:     fluconazole (DIFLUCAN) 150 MG tablet, , Disp: , Rfl:     levoFLOXacin (LEVAQUIN) 500 MG tablet, , Disp: , Rfl:     metroNIDAZOLE (FLAGYL) 500 MG tablet, , Disp: , Rfl:     multivitamin with minerals tablet tablet, multivitamin oral tablet take 1 tablet by oral route daily   Active, Disp: , Rfl:     QUEtiapine (SEROquel) 50 MG tablet, , Disp: , Rfl:     triamcinolone (KENALOG) 0.1 % ointment, , Disp: , Rfl:      Allergies   Allergen Reactions    Bee Venom Anaphylaxis    Penicillins Anaphylaxis       Family History   Problem Relation Age of Onset    No Known Problems Father     No Known Problems Mother     Lung cancer Maternal Grandfather     Breast cancer Maternal Aunt     Colon cancer Maternal Uncle     Ovarian cancer Neg Hx     Uterine cancer Neg Hx         Social History     Social History Narrative    Not on file       Objective     Vital Signs:   Temp 97.4 °F (36.3 °C) (Tympanic)   Ht 165.1 cm (65\")   Wt 57.8 kg (127 lb 6.4 oz)   BMI 21.20 kg/m²       Physical Exam    Constitutional   Appearance  : well developed, well-nourished, alert and in no acute distress, voice clear and strong    Head  Inspection  : no deformities or lesions  Face  Inspection  : No facial lesions; House-Brackmann I/VI bilaterally  Palpation  : No TMJ crepitus nor  muscle tenderness bilaterally    Eyes  Vision  Visual Fields  : Extraocular movements are intact. No spontaneous or gaze-induced nystagmus.  Conjunctivae  : clear  Sclerae  : clear  Pupils and Irises  : pupils equal, round, and reactive to light.     Ears, Nose, Mouth and Throat    Ears    External Ears  : appearance within normal limits, no lesions present  Otoscopic Examination  : Tympanic membrane appearance within normal limits on the left, right ear with cerumen and small inferior perforation, prosthesis is visible underneath cartilage graft, overall stable " exam, well-aerated middle ears  Hearing  : intact to conversational voice both ears  Tunning fork testing:     :    Nose    External Nose  : appearance normal  Intranasal Exam  : mucosa within normal limits, vestibules normal, no intranasal lesions present, septum midline, sinuses non tender to percussion  Oral Cavity    Oral Mucosa  : oral mucosa normal without pallor or cyanosis  Lips  : lip appearance normal  Teeth  : normal dentition for age  Gums  : gums pink, non-swollen, no bleeding present  Tongue  : tongue appearance normal; normal mobility  Palate  : hard palate normal, soft palate appearance normal with symmetric mobility    Throat    Oropharynx  : no inflammation or lesions present, tonsils within normal limits  Hypopharynx  : appearance within normal limits, superior epiglottis within normal limits  Larynx  : appearance within normal limits, vocal cords within normal limits, no lesions present    Neck  Inspection/Palpation  : normal appearance, no masses or tenderness, trachea midline; thyroid size normal, nontender, no nodules or masses present on palpation    Respiratory  Respiratory Effort  : breathing unlabored  Inspection of Chest  : normal appearance, no retractions    Cardiovascular  Heart  : regular rate and rhythm    Lymphatic  Neck  : no lymphadenopathy present  Supraclavicular Nodes  : no lymphadenopathy present  Preauricular Nodes  : no lymphadenopathy present    Skin and Subcutaneous Tissue  General Inspection  : Regarding face and neck - there are no rashes present, no lesions present, and no areas of discoloration    Neurologic  Cranial Nerves  : cranial nerves II-XII are grossly intact bilaterally  Gait and Station  : normal gait, able to stand without diffculty    Psychiatric  Judgement and Insight  : judgment and insight intact  Mood and Affect  : mood normal, affect appropriate     Ear Cerumen Removal    Date/Time: 12/12/2023 10:44 AM    Performed by: Dong Rangel,  MD  Authorized by: Dong Rangel MD    Anesthesia:  Local Anesthetic: none  Location details: right ear  Procedure type: instrumentation, curette, alligator forceps, curved hook   Sedation:  Patient sedated: no                Assessment and Plan    Diagnoses and all orders for this visit:    1. Cholesteatoma of right ear (Primary)    2. Dysfunction of both eustachian tubes    3. Chronic mucoid otitis media of right ear    4. Conductive hearing loss of right ear with restricted hearing of left ear    5. Impacted cerumen of right ear    Other orders  -     Ear Cerumen Removal    Examination today revealed the left ear to be normal.  Right ear was filled with cerumen which I was able to remove.  She does have a stable small perforation anteriorly inferiorly with dry middle ear.  The prosthesis is visible underneath the cartilage graft along the posterior aspect of the eardrum.  No evidence of cholesteatoma.  I discussed the findings with her.  We did discuss using hearing aids and she informing that she will try to go back to have them adjusted.  She understands to maintain water precautions.  I will plan on seeing her back in the clinic in 6 months to reassess the ears or sooner should there be any issues.    Follow Up   No follow-ups on file.  Patient was given instructions and counseling regarding her condition or for health maintenance advice. Please see specific information pulled into the AVS if appropriate.

## 2024-03-14 ENCOUNTER — OFFICE VISIT (OUTPATIENT)
Dept: OBSTETRICS AND GYNECOLOGY | Facility: CLINIC | Age: 44
End: 2024-03-14
Payer: COMMERCIAL

## 2024-03-14 VITALS
HEART RATE: 118 BPM | SYSTOLIC BLOOD PRESSURE: 132 MMHG | WEIGHT: 126 LBS | DIASTOLIC BLOOD PRESSURE: 71 MMHG | BODY MASS INDEX: 20.99 KG/M2 | HEIGHT: 65 IN

## 2024-03-14 DIAGNOSIS — Z11.3 SCREEN FOR STD (SEXUALLY TRANSMITTED DISEASE): ICD-10-CM

## 2024-03-14 DIAGNOSIS — Z01.419 ENCOUNTER FOR GYNECOLOGICAL EXAMINATION WITHOUT ABNORMAL FINDING: Primary | ICD-10-CM

## 2024-03-14 LAB
HBV SURFACE AG SERPL QL IA: NORMAL
HCV AB SER DONR QL: NORMAL
HIV 1+2 AB+HIV1 P24 AG SERPL QL IA: NORMAL
T PALLIDUM IGG SER QL: NORMAL

## 2024-03-14 PROCEDURE — 87340 HEPATITIS B SURFACE AG IA: CPT | Performed by: OBSTETRICS & GYNECOLOGY

## 2024-03-14 PROCEDURE — 86803 HEPATITIS C AB TEST: CPT | Performed by: OBSTETRICS & GYNECOLOGY

## 2024-03-14 PROCEDURE — G0432 EIA HIV-1/HIV-2 SCREEN: HCPCS | Performed by: OBSTETRICS & GYNECOLOGY

## 2024-03-14 PROCEDURE — 86780 TREPONEMA PALLIDUM: CPT | Performed by: OBSTETRICS & GYNECOLOGY

## 2024-03-14 PROCEDURE — G0123 SCREEN CERV/VAG THIN LAYER: HCPCS | Performed by: OBSTETRICS & GYNECOLOGY

## 2024-03-14 PROCEDURE — 87591 N.GONORRHOEAE DNA AMP PROB: CPT | Performed by: OBSTETRICS & GYNECOLOGY

## 2024-03-14 PROCEDURE — 87491 CHLMYD TRACH DNA AMP PROBE: CPT | Performed by: OBSTETRICS & GYNECOLOGY

## 2024-03-14 NOTE — PROGRESS NOTES
"Well Woman Visit    CC: Annual well woman exam       HPI:   43 y.o. Contraception or HRT: Contraception:  Bilateral salpingectomy  Menses:   q mon, lasts 7 days, changes products q 3hrs on heaviest days.   Pain:  None  Incontinence concerns: No  Hx of abnormal pap:  No  Pt has no complaints today. Desires full std screen. Denies sx.      History: PMHx, Meds, Allergies, PSHx, Social Hx, and POBHx all reviewed and updated.      PHYSICAL EXAM:  /71   Pulse 118   Ht 165.1 cm (65\")   Wt 57.2 kg (126 lb)   LMP 2024 (Approximate)   BMI 20.97 kg/m²   General- NAD, alert and oriented, appropriate  Psych- Normal mood, good memory  Neck- No masses, no thyroid enlargement  CV- Regular rhythm, no murnurs  Resp- CTA to bases, no wheezes  Abdomen- Soft, non distended, non tender, no masses    Breast left-  Bilaterally symmetrical, no masses, non tender, no nipple discharge  Breast right- Bilaterally symmetrical, no masses, non tender, no nipple discharge    External genitalia- Normal female, no lesions  Urethra/meatus- Normal, no masses, non tender, no prolapse  Bladder- Normal, no masses, non tender, no prolapse  Vagina- Normal, no atrophy, no lesions, no discharge, no prolapse  Cvx- Normal, no lesions, no discharge, No cervical motion tenderness  Uterus- Normal size, shape & consistency.  Non tender, mobile.  Adnexa- No mass, non tender  Anus/Rectum/Perineum- Not performed    Lymphatic- No palpable neck, axillary, or groin nodes  Ext- No edema, no cyanosis    Skin- No lesions, no rashes, no acanthosis nigricans        ASSESSMENT and PLAN:  WWE    Diagnoses and all orders for this visit:    1. Encounter for gynecological examination without abnormal finding (Primary)  -     Mammo Screening Digital Tomosynthesis Bilateral With CAD; Future  -     IGP, CtNg, Rfx Aptima HPV ASCU    2. Screen for STD (sexually transmitted disease)  -     IGP, CtNg, Rfx Aptima HPV ASCU  -     Hepatitis B Surface Antigen  -     " Hepatitis C Antibody  -     HIV-1 & HIV-2 Antibodies  -     T Pallidum Antibody w/ reflex RPR (Syphilis)    Normal co-test last year. Discussed guidelines. Pt desires proceeding with pap this year.     Counseling:     Track menses, RTO IF <q21d, >7d long, or heavy    Domestic violence/abuse screen: negative    Depression screen: no SI    Preventative:   BREAST HEALTH- Monthly self breast exam importance and how to reviewed. MMG and/or MRI (prn) reviewed per society guidelines and her individual history. Mammo/MRI screen: Updated today.  CERVICAL CANCER Screening- Reviewed current ASCCP guidelines for screening w and wo cotest HPV, age specific.  Screen: Updated today.  COLON CANCER Screening- Reviewed current medical society guidelines and options.  Colonoscopy screen:  Not medically needed.  SEXUAL HEALTH: STD screening desired.  Ordered.  VACCINATIONS Recommended: Flu annually.  Importance discussed, risk being unvaccinated reviewed.  Questions answered  Smoking status- NON SMOKER.  Importance of avoiding second hand smoke.  Follow up PCP/Specialist PMHx and Labs  [facility] : does not qualify      She understands the importance of having any ordered tests to be performed in a timely fashion.  She is encouraged to review her results online and/or contact or office if she has questions.     Follow Up:  Return in about 1 year (around 3/14/2025) for Annual physical.      Ann Don, APRN  03/14/2024

## 2024-03-21 ENCOUNTER — TELEPHONE (OUTPATIENT)
Dept: OBSTETRICS AND GYNECOLOGY | Facility: CLINIC | Age: 44
End: 2024-03-21
Payer: COMMERCIAL

## 2024-03-21 LAB
C TRACH RRNA CVX QL NAA+PROBE: NEGATIVE
CONV .: ABNORMAL
CYTOLOGIST CVX/VAG CYTO: ABNORMAL
CYTOLOGY CVX/VAG DOC CYTO: ABNORMAL
CYTOLOGY CVX/VAG DOC THIN PREP: ABNORMAL
DX ICD CODE: ABNORMAL
DX ICD CODE: ABNORMAL
HPV I/H RISK 4 DNA CVX QL PROBE+SIG AMP: NEGATIVE
Lab: ABNORMAL
N GONORRHOEA RRNA CVX QL NAA+PROBE: NEGATIVE
OTHER STN SPEC: ABNORMAL
PATHOLOGIST CVX/VAG CYTO: ABNORMAL
STAT OF ADQ CVX/VAG CYTO-IMP: ABNORMAL

## 2024-03-25 ENCOUNTER — TELEPHONE (OUTPATIENT)
Dept: OBSTETRICS AND GYNECOLOGY | Facility: CLINIC | Age: 44
End: 2024-03-25
Payer: COMMERCIAL

## 2024-03-25 NOTE — TELEPHONE ENCOUNTER
Caller: Virgie Kim    Relationship to patient: Self    Best call back number: [phone] (home)  CAN CALL BACK     INQUIRING ABOUT TEST RESULTS FROM 03-.

## 2024-03-25 NOTE — TELEPHONE ENCOUNTER
----- Message from LOUISE Dominguez sent at 3/25/2024 11:59 AM EDT -----  Atypical cells. No hpv. We can recheck it in one year. Thanks

## 2024-06-18 ENCOUNTER — PROCEDURE VISIT (OUTPATIENT)
Dept: OTOLARYNGOLOGY | Facility: CLINIC | Age: 44
End: 2024-06-18
Payer: COMMERCIAL

## 2024-06-18 ENCOUNTER — OFFICE VISIT (OUTPATIENT)
Dept: OTOLARYNGOLOGY | Facility: CLINIC | Age: 44
End: 2024-06-18
Payer: COMMERCIAL

## 2024-06-18 VITALS
DIASTOLIC BLOOD PRESSURE: 84 MMHG | BODY MASS INDEX: 21.76 KG/M2 | HEIGHT: 65 IN | WEIGHT: 130.6 LBS | SYSTOLIC BLOOD PRESSURE: 121 MMHG | HEART RATE: 89 BPM | TEMPERATURE: 97.6 F

## 2024-06-18 DIAGNOSIS — H90.A22 SENSORINEURAL HEARING LOSS (SNHL) OF LEFT EAR WITH RESTRICTED HEARING OF RIGHT EAR: ICD-10-CM

## 2024-06-18 DIAGNOSIS — H69.93 DYSFUNCTION OF BOTH EUSTACHIAN TUBES: ICD-10-CM

## 2024-06-18 DIAGNOSIS — H90.A11 CONDUCTIVE HEARING LOSS OF RIGHT EAR WITH RESTRICTED HEARING OF LEFT EAR: ICD-10-CM

## 2024-06-18 DIAGNOSIS — H71.91 CHOLESTEATOMA OF RIGHT EAR: Primary | ICD-10-CM

## 2024-06-18 DIAGNOSIS — H90.A31 MIXED CONDUCTIVE AND SENSORINEURAL HEARING LOSS OF RIGHT EAR WITH RESTRICTED HEARING OF LEFT EAR: ICD-10-CM

## 2024-06-18 NOTE — PROGRESS NOTES
AUDIOMETRIC EVALUATION      Name:  Virgie Kmi  :  1980  Age:  43 y.o.  Date of Evaluation:  2024       History:  Mrs. Kim is seen today for a hearing evaluation due to history of cholesteatoma at the right ear.    Audiologic Information:  Concerns for Hearing: Yes  PETs: No  Other otologic surgical history: Cholesteatoma removal right ear  Aural Pressure/Fullness: Yes  Otalgia: No  Otorrhea: None  Tinnitus: No  Dizziness: No  Noise Exposure: None  Family history of hearing loss: No  Head trauma requiring hospital stay: None  Chemotherapy: No  Other significant history: No    EVALUATION:    See audiogram    RESULTS:    Otoscopic Evaluation:        NOTE: Testing completed after ears were examined by Dr. Rangel    Tympanometry (226 Hz):  Right: Type B, Normal ECV  Left: Type C    IMPRESSIONS:  Pure tone thresholds for the right ear shows a severe rising to moderate falling to profound mixed hearing loss.  Pure tone thresholds for the left ear shows mild high-frequency sensorineural hearing loss.  Patient was counseled with regard to the findings.    Amplification needs: Currently wears amplification    RECOMMENDATIONS/PLAN:  Follow-up recommendations Dr. Rangel  Discussed results and recommendations with patient.         Moises Turner M.S, [facility]  Licensed Audiologist

## 2024-06-18 NOTE — PROGRESS NOTES
Patient Name: Virgie Kim   Visit Date: 06/18/2024   Patient ID: [id number]  Provider: Dong Rangel MD    Sex: female  Location: [facility]   YOB: 1980  Location Address: [address] [place] [address], [address],?[place]?[address]    Primary Care Provider Srinivasa Ogden MD  Location Phone: [phone]    Referring Provider: No ref. provider found        Chief Complaint  Follow-up (Cholesteatoma of right ear )    Subjective    History of Present Illness  Virgie Kim is a 43 y.o. female who presents to [facility] today as a consult from No ref. provider found.    She presents to the clinic today for follow-up regarding history of cholesteatoma of the right ear with continued eustachian tube dysfunction and conductive hearing loss.  I last saw her in the clinic in December, and she informs me that overall she has been doing well.  She does note some decrease in her hearing on the right side.  She was previously using a hearing aid, but has not used it as she was having some difficulty with a background noise.  She denies any drainage out of the ear, and has not had any ear pain or pressure symptoms.  Denies any symptoms on the contralateral side.  Her hearing has been stable on her left.    Past Medical History:   Diagnosis Date    Abnormal Pap smear of cervix     Anxiety     Cholesteatoma     ETD (eustachian tube dysfunction)     HL (hearing loss)     HPV (human papilloma virus) infection     Hypothyroidism     Impacted cerumen     Otalgia     Otitis media        Past Surgical History:   Procedure Laterality Date    EXTERNAL EAR SURGERY      LASER ABLATION      SALPINGECTOMY Bilateral          Current Outpatient Medications:     diazePAM (VALIUM) 5 MG tablet, , Disp: , Rfl:     fluconazole (DIFLUCAN) 150 MG tablet, , Disp: , Rfl:     levoFLOXacin (LEVAQUIN) 500 MG tablet, , Disp: , Rfl:     methocarbamol (ROBAXIN) 750 MG  "tablet, Take 1 tablet by mouth 4 (Four) Times a Day., Disp: , Rfl:     metroNIDAZOLE (FLAGYL) 500 MG tablet, , Disp: , Rfl:     multivitamin with minerals tablet tablet, multivitamin oral tablet take 1 tablet by oral route daily   Active (Patient not taking: Reported on 6/18/2024), Disp: , Rfl:     QUEtiapine (SEROquel) 50 MG tablet, , Disp: , Rfl:     triamcinolone (KENALOG) 0.1 % ointment, , Disp: , Rfl:      Allergies   Allergen Reactions    Bee Venom Anaphylaxis    Penicillins Anaphylaxis       Family History   Problem Relation Age of Onset    No Known Problems Father     No Known Problems Mother     Lung cancer Maternal Grandfather     Breast cancer Maternal Aunt     Colon cancer Maternal Uncle     Ovarian cancer Neg Hx     Uterine cancer Neg Hx         Social History     Social History Narrative    Not on file       Objective     Vital Signs:   /84 (BP Location: Left arm, Patient Position: Sitting, Cuff Size: Adult)   Pulse 89   Temp 97.6 °F (36.4 °C) (Tympanic)   Ht 165.1 cm (65\")   Wt 59.2 kg (130 lb 9.6 oz)   BMI 21.73 kg/m²       Physical Exam    Constitutional   Appearance  : well developed, well-nourished, alert and in no acute distress, voice clear and strong    Head  Inspection  : no deformities or lesions  Face  Inspection  : No facial lesions; House-Brackmann I/VI bilaterally  Palpation  : No TMJ crepitus nor  muscle tenderness bilaterally    Eyes  Vision  Visual Fields  : Extraocular movements are intact. No spontaneous or gaze-induced nystagmus.  Conjunctivae  : clear  Sclerae  : clear  Pupils and Irises  : pupils equal, round, and reactive to light.     Ears, Nose, Mouth and Throat    Ears    External Ears  : appearance within normal limits, no lesions present  Otoscopic Examination  : Tympanic membrane appearance within normal limits on the left without perforations, well-aerated middle ears.  Right ear with retraction and pinpoint perforation.  Overall stable exam.  No " evidence of cholesteatoma or drainage.  Hearing  : intact to conversational voice both ears  Tunning fork testing:     :    Nose    External Nose  : appearance normal  Intranasal Exam  : mucosa within normal limits, vestibules normal, no intranasal lesions present, septum midline, sinuses non tender to percussion  Oral Cavity    Oral Mucosa  : oral mucosa normal without pallor or cyanosis  Lips  : lip appearance normal  Teeth  : normal dentition for age  Gums  : gums pink, non-swollen, no bleeding present  Tongue  : tongue appearance normal; normal mobility  Palate  : hard palate normal, soft palate appearance normal with symmetric mobility    Throat    Oropharynx  : no inflammation or lesions present, tonsils within normal limits  Hypopharynx  : appearance within normal limits, superior epiglottis within normal limits  Larynx  : appearance within normal limits, vocal cords within normal limits, no lesions present    Neck  Inspection/Palpation  : normal appearance, no masses or tenderness, trachea midline; thyroid size normal, nontender, no nodules or masses present on palpation    Respiratory  Respiratory Effort  : breathing unlabored  Inspection of Chest  : normal appearance, no retractions    Cardiovascular  Heart  : regular rate and rhythm    Lymphatic  Neck  : no lymphadenopathy present  Supraclavicular Nodes  : no lymphadenopathy present  Preauricular Nodes  : no lymphadenopathy present    Skin and Subcutaneous Tissue  General Inspection  : Regarding face and neck - there are no rashes present, no lesions present, and no areas of discoloration    Neurologic  Cranial Nerves  : cranial nerves II-XII are grossly intact bilaterally  Gait and Station  : normal gait, able to stand without diffculty    Psychiatric  Judgement and Insight  : judgment and insight intact  Mood and Affect  : mood normal, affect appropriate              Assessment and Plan    Diagnoses and all orders for this visit:    1. Cholesteatoma of  right ear (Primary)  -     Comprehensive Hearing Test; Future  -     Tympanometry; Future    2. Dysfunction of both eustachian tubes    3. Conductive hearing loss of right ear with restricted hearing of left ear  -     Comprehensive Hearing Test; Future  -     Tympanometry; Future    Examination today revealed an overall stable exam.  I did repeat an audiogram and this revealed normal hearing on the left ear with some high pitch hearing loss that is mild.  In the right ear there is been a decrease in her hearing with more conductive hearing loss than was previously seen 2-1/2 years ago for low frequencies.  The mid and high frequencies are stable.  Word discrimination scores remain good at 100%.  I have advised her to have her hearing aid recalibrated.  I will plan to see her back in the clinic in 6 months to reassess her ear, or sooner should there be any issues.    Follow Up   No follow-ups on file.  Patient was given instructions and counseling regarding her condition or for health maintenance advice. Please see specific information pulled into the AVS if appropriate.

## 2024-08-27 ENCOUNTER — HOSPITAL ENCOUNTER (OUTPATIENT)
Dept: MAMMOGRAPHY | Facility: HOSPITAL | Age: 44
Discharge: HOME | End: 2024-08-27
Admitting: OBSTETRICS & GYNECOLOGY
Payer: COMMERCIAL

## 2024-08-27 DIAGNOSIS — Z01.419 ENCOUNTER FOR GYNECOLOGICAL EXAMINATION WITHOUT ABNORMAL FINDING: ICD-10-CM

## 2024-08-27 PROCEDURE — 77063 BREAST TOMOSYNTHESIS BI: CPT

## 2024-08-27 PROCEDURE — 77067 SCR MAMMO BI INCL CAD: CPT

## 2025-03-10 ENCOUNTER — OFFICE VISIT (OUTPATIENT)
Dept: OTOLARYNGOLOGY | Facility: CLINIC | Age: 45
End: 2025-03-10
Payer: COMMERCIAL

## 2025-03-10 VITALS
SYSTOLIC BLOOD PRESSURE: 115 MMHG | DIASTOLIC BLOOD PRESSURE: 83 MMHG | OXYGEN SATURATION: 98 % | HEART RATE: 88 BPM | TEMPERATURE: 97.5 F

## 2025-03-10 DIAGNOSIS — H90.A31 MIXED CONDUCTIVE AND SENSORINEURAL HEARING LOSS OF RIGHT EAR WITH RESTRICTED HEARING OF LEFT EAR: Primary | ICD-10-CM

## 2025-03-10 DIAGNOSIS — H61.21 IMPACTED CERUMEN OF RIGHT EAR: ICD-10-CM

## 2025-03-10 DIAGNOSIS — H71.91 CHOLESTEATOMA OF RIGHT EAR: ICD-10-CM

## 2025-03-10 DIAGNOSIS — H93.13 TINNITUS OF BOTH EARS: ICD-10-CM

## 2025-03-10 DIAGNOSIS — H90.A22 SENSORINEURAL HEARING LOSS (SNHL) OF LEFT EAR WITH RESTRICTED HEARING OF RIGHT EAR: ICD-10-CM

## 2025-03-10 NOTE — PROGRESS NOTES
Patient Name: Virgie Kim   Visit Date: 03/10/2025   Patient ID: [id number]  Provider: Chavez Reed MD    Sex: female  Location: [facility]   YOB: 1980  Location Address: [address] [place] [address], [address],?[place]?[address]    Primary Care Provider Srinivasa Ogden MD  Location Phone: [phone]    Referring Provider: No ref. provider found        Chief Complaint  6 month follow up-cholesteatoma    History of Present Illness  Virgie Kim is a 44 y.o. female who returns today for follow-up with a history of right-sided cholesteatoma.  She was previously followed by Dr. Rangel and last seen on 6/18/2024.  Audiogram on 6/18/2024 revealed left normal downsloping to mild sensorineural hearing loss and right severe rising to moderate downsloping to profound mixed loss.  SRT was 60 on the right and 15 on the left.  Word recognition was 100% on the right at 85 dB and 100% on the left at 65 dB.  Tympanogram was type B on the right and type C on the left.  Dr. Rangel had previously reported that she had undergone several right-sided ear surgeries at around 26 years of age.  She tells me she does experience some right-sided ear pressure for which the tube has helped previously.  She denies any otalgia or otorrhea.  She does report tinnitus.  She has tried a hearing aid in her right ear briefly in the past but did not find it helpful.  Past Medical History:   Diagnosis Date    Abnormal Pap smear of cervix     Anxiety     Cholesteatoma     ETD (eustachian tube dysfunction)     HL (hearing loss)     HPV (human papilloma virus) infection     Hypothyroidism     Impacted cerumen     Otalgia     Otitis media     PMS (premenstrual syndrome)     Varicella        Past Surgical History:   Procedure Laterality Date    EXTERNAL EAR SURGERY      EYE SURGERY      LASER ABLATION      SALPINGECTOMY Bilateral     TONSILLECTOMY      TUBAL ABDOMINAL LIGATION      WISDOM TOOTH  EXTRACTION           Current Outpatient Medications:     methocarbamol (ROBAXIN) 750 MG tablet, Take 1 tablet by mouth 4 (Four) Times a Day., Disp: , Rfl:     multivitamin with minerals tablet tablet, , Disp: , Rfl:     triamcinolone (KENALOG) 0.1 % ointment, , Disp: , Rfl:     diazePAM (VALIUM) 5 MG tablet, , Disp: , Rfl:     fluconazole (DIFLUCAN) 150 MG tablet, , Disp: , Rfl:     QUEtiapine (SEROquel) 50 MG tablet, , Disp: , Rfl:      Allergies   Allergen Reactions    Bee Venom Anaphylaxis    Penicillins Anaphylaxis       Social History     Tobacco Use    Smoking status: Never     Passive exposure: Never    Smokeless tobacco: Never   Vaping Use    Vaping status: Never Used   Substance Use Topics    Alcohol use: Not Currently    Drug use: Never        Objective     Vital Signs:   /83   Pulse 88   Temp 97.5 °F (36.4 °C)   SpO2 98%       Physical Exam    General: Well developed, well nourished patient of stated age in no acute distress. Voice is strong and clear.   Head: Normocephalic and atraumatic.  Face: No lesions.  Bilateral parotid and submandibular glands are unremarkable.  Stensen's and Warthin's ducts are productive of clear saliva bilaterally.  House-Brackmann I/VI     bilaterally.   muscles and temporomandibular joint nontender to palpation.  No TMJ crepitus.  Eyes: PERRLA, sclerae anicteric, no conjunctival injection. Extraocular movements are intact and full. No nystagmus.   Ears: Auricles are normal in appearance. Bilateral external auditory canals are unremarkable aside from right-sided cerumen impaction which was removed using the operating microscope and alligator forceps.  Left tympanic membrane appears mildly retracted.  Right tympanic membrane is significantly retracted with a plastic TORP prosthesis visible but not extruding through the tympanic membrane.  No evidence of cholesteatoma.  Hearing normal to conversational voice.   Nose: External nose is normal in appearance.  Bilateral nares are patent with normal appearing mucosa. Septum midline. Turbinates are unremarkable. No lesions.   Oral Cavity: Lips are normal in appearance. Oral mucosa is unremarkable. Gingiva is unremarkable. Normal dentition for age. Tongue is unremarkable with good movement. Hard palate is unremarkable.   Oropharynx: Soft palate is unremarkable with full movement. Uvula is unremarkable. Bilateral tonsils are unremarkable. Posterior oropharynx is unremarkable.    Larynx and hypopharynx: Deferred secondary to gag reflex.  Neck: Supple.  No mass.  Nontender to palpation.  Trachea midline. Thyroid normal size and without nodules to palpation.   Lymphatic: No lymphadenopathy upon palpation.  Respiratory: Clear to auscultation bilaterally, nonlabored respirations    Cardiovascular: RRR, no murmurs, rubs, or gallops,   Psychiatric: Appropriate affect, cooperative   Neurologic: Oriented x 3, strength symmetric in all extremities, Cranial Nerves II-XII are grossly intact to confrontation   Skin: Warm and dry. No rashes.    Procedures           Result Review :               Assessment and Plan    Diagnoses and all orders for this visit:    1. Mixed conductive and sensorineural hearing loss of right ear with restricted hearing of left ear (Primary)  -     Ambulatory Referral to Audiology    2. Sensorineural hearing loss (SNHL) of left ear with restricted hearing of right ear    3. Tinnitus of both ears    4. Cholesteatoma of right ear  -     Ambulatory Referral to Audiology    5. Impacted cerumen of right ear    Impressions and findings were discussed at great length.  Currently, she is seen today for follow-up after undergoing multiple right-sided otologic surgeries for a likely cholesteatoma.  Examination today reveals left tympanic membrane to be mildly retracted while the right tympanic membrane is significantly retracted with a plastic TORP visible through the TM.  There is no evidence of cholesteatoma.  We  reviewed and discussed the results of her 6/18/2024 audiogram and tympanogram.  Options for management were discussed and I have recommended continued observation.  In regards to her hearing we discussed options for management including continuing to do nothing versus right-sided conventional amplification versus bone-anchored hearing aid.  We reviewed the risks, benefits, and alternatives.  After thorough discussion she would like to be further evaluated for bone-anchored hearing aid placement and to see if she would be a candidate.  She will be referred to Dr. Sinha for further evaluation.  She was given ample time to ask questions, all of which were answered        Follow Up   No follow-ups on file.  Patient was given instructions and counseling regarding her condition or for health maintenance advice. Please see specific information pulled into the AVS if appropriate.

## 2025-03-18 ENCOUNTER — OFFICE VISIT (OUTPATIENT)
Dept: OBSTETRICS AND GYNECOLOGY | Facility: CLINIC | Age: 45
End: 2025-03-18
Payer: COMMERCIAL

## 2025-03-18 VITALS
BODY MASS INDEX: 21.66 KG/M2 | HEART RATE: 90 BPM | WEIGHT: 130 LBS | DIASTOLIC BLOOD PRESSURE: 70 MMHG | HEIGHT: 65 IN | SYSTOLIC BLOOD PRESSURE: 116 MMHG

## 2025-03-18 DIAGNOSIS — Z01.419 ENCOUNTER FOR GYNECOLOGICAL EXAMINATION WITHOUT ABNORMAL FINDING: Primary | ICD-10-CM

## 2025-03-18 DIAGNOSIS — Z11.3 SCREEN FOR STD (SEXUALLY TRANSMITTED DISEASE): ICD-10-CM

## 2025-03-18 LAB
HBV SURFACE AG SERPL QL IA: NORMAL
HCV AB SER QL: NORMAL
HIV 1+2 AB+HIV1 P24 AG SERPL QL IA: NORMAL
TREPONEMA PALLIDUM IGG+IGM AB [PRESENCE] IN SERUM OR PLASMA BY IMMUNOASSAY: NORMAL

## 2025-03-18 PROCEDURE — G0123 SCREEN CERV/VAG THIN LAYER: HCPCS | Performed by: OBSTETRICS & GYNECOLOGY

## 2025-03-18 PROCEDURE — 87340 HEPATITIS B SURFACE AG IA: CPT | Performed by: OBSTETRICS & GYNECOLOGY

## 2025-03-18 PROCEDURE — G0432 EIA HIV-1/HIV-2 SCREEN: HCPCS | Performed by: OBSTETRICS & GYNECOLOGY

## 2025-03-18 PROCEDURE — 87624 HPV HI-RISK TYP POOLED RSLT: CPT | Performed by: OBSTETRICS & GYNECOLOGY

## 2025-03-18 PROCEDURE — 87591 N.GONORRHOEAE DNA AMP PROB: CPT | Performed by: OBSTETRICS & GYNECOLOGY

## 2025-03-18 PROCEDURE — 86780 TREPONEMA PALLIDUM: CPT | Performed by: OBSTETRICS & GYNECOLOGY

## 2025-03-18 PROCEDURE — 87491 CHLMYD TRACH DNA AMP PROBE: CPT | Performed by: OBSTETRICS & GYNECOLOGY

## 2025-03-18 PROCEDURE — 86803 HEPATITIS C AB TEST: CPT | Performed by: OBSTETRICS & GYNECOLOGY

## 2025-03-18 PROCEDURE — 87661 TRICHOMONAS VAGINALIS AMPLIF: CPT | Performed by: OBSTETRICS & GYNECOLOGY

## 2025-03-18 NOTE — PROGRESS NOTES
"Well Woman Visit    CC: Annual well woman exam       HPI:   44 y.o. Contraception or HRT: Contraception:  Bilateral salpingectomy  Menses:   q mon, lasts 7 days, changes products q 4hrs on heaviest days.   Pain:  None  Incontinence concerns: No  Hx of abnormal pap:  Yes  Pt has no complaints today. Desires full std screen. Denies sx.       History: PMHx, Meds, Allergies, PSHx, Social Hx, and POBHx all reviewed and updated.      PHYSICAL EXAM:  /70   Pulse 90   Ht 165.1 cm (65\")   Wt 59 kg (130 lb)   LMP 2024 (Approximate)   BMI 21.63 kg/m²   General- NAD, alert and oriented, appropriate  Psych- Normal mood, good memory  Neck- No masses, no thyroid enlargement  CV- Regular rhythm, no murnurs  Resp- CTA to bases, no wheezes  Abdomen- Soft, non distended, non tender, no masses    Breast left-  Bilaterally symmetrical, no masses, non tender, no nipple discharge  Breast right- Bilaterally symmetrical, no masses, non tender, no nipple discharge    External genitalia- Normal female, no lesions  Urethra/meatus- Normal, no masses, non tender, no prolapse  Bladder- Normal, no masses, non tender, no prolapse  Vagina- Normal, no atrophy, no lesions, no discharge, no prolapse  Cvx- Normal, no lesions, no discharge, No cervical motion tenderness  Uterus- Normal size, shape & consistency.  Non tender, mobile.  Adnexa- No mass, non tender  Anus/Rectum/Perineum- Not performed    Lymphatic- No palpable neck, axillary, or groin nodes  Ext- No edema, no cyanosis    Skin- No lesions, no rashes, no acanthosis nigricans        ASSESSMENT and PLAN:  WWE    Diagnoses and all orders for this visit:    1. Encounter for gynecological examination without abnormal finding (Primary)  -     Mammo Screening Digital Tomosynthesis Bilateral With CAD; Future  -     IGP,CtNgTv,Apt HPV    2. Screen for STD (sexually transmitted disease)  -     IGP,CtNgTv,Apt HPV  -     Hepatitis B Surface Antigen  -     Hepatitis C Antibody  -    "  HIV-1 & HIV-2 Antibodies  -     Treponema pallidum AB w/Reflex RPR    Desires to continue with yearly paps for now.     Counseling:     Track menses, RTO IF <q21d, >7d long, or heavy    Domestic violence/abuse screen: negative    Depression screen: no SI    Preventative:   BREAST HEALTH- Monthly self breast exam importance and how to reviewed. MMG and/or MRI (prn) reviewed per society guidelines and her individual history. Mammo/MRI screen: Updated today.  CERVICAL CANCER Screening- Reviewed current ASCCP guidelines for screening w and wo cotest HPV, age specific.  Screen: Updated today.  COLON CANCER Screening- Reviewed current medical society guidelines and options.  Colonoscopy screen:  Not medically needed.  SEXUAL HEALTH: STD screening desired.  Ordered.  VACCINATIONS Recommended: Flu annually.  Importance discussed, risk being unvaccinated reviewed.  Questions answered  Smoking status- NON SMOKER.  Importance of avoiding second hand smoke.  Follow up PCP/Specialist PMHx and Labs  [facility] : previously tested, increased risk for breast cancer, declined breast mri      She understands the importance of having any ordered tests to be performed in a timely fashion.  She is encouraged to review her results online and/or contact or office if she has questions.     Follow Up:  Return in about 1 year (around 3/18/2026) for Annual physical.      Ann Don, APRN  03/18/2025

## 2025-03-21 LAB
C TRACH RRNA CVX QL NAA+PROBE: NEGATIVE
CYTOLOGIST CVX/VAG CYTO: ABNORMAL
CYTOLOGY CVX/VAG DOC CYTO: ABNORMAL
CYTOLOGY CVX/VAG DOC THIN PREP: ABNORMAL
DX ICD CODE: ABNORMAL
DX ICD CODE: ABNORMAL
HPV I/H RISK 4 DNA CVX QL PROBE+SIG AMP: POSITIVE
N GONORRHOEA RRNA CVX QL NAA+PROBE: NEGATIVE
OTHER STN SPEC: ABNORMAL
PATHOLOGIST CVX/VAG CYTO: ABNORMAL
RECOM F/U CVX/VAG CYTO: ABNORMAL
SERVICE CMNT-IMP: ABNORMAL
STAT OF ADQ CVX/VAG CYTO-IMP: ABNORMAL
T VAGINALIS RRNA SPEC QL NAA+PROBE: NEGATIVE

## 2025-03-24 ENCOUNTER — RESULTS FOLLOW-UP (OUTPATIENT)
Dept: OBSTETRICS AND GYNECOLOGY | Facility: CLINIC | Age: 45
End: 2025-03-24
Payer: COMMERCIAL

## 2025-03-25 ENCOUNTER — TELEPHONE (OUTPATIENT)
Dept: OBSTETRICS AND GYNECOLOGY | Facility: CLINIC | Age: 45
End: 2025-03-25
Payer: COMMERCIAL

## 2025-03-25 DIAGNOSIS — B97.7 HIGH RISK HPV INFECTION: Primary | ICD-10-CM

## 2025-03-25 NOTE — TELEPHONE ENCOUNTER
Patient informed of her pap results and is concerned with waiting 1 year to repeat. She states she had some concerns with her pap last year and she had to have something removed in 2015. Please advise.

## 2025-03-26 NOTE — TELEPHONE ENCOUNTER
Patient notified additional testing is being ran. Please sign the attached order for the 16, 18/45 HPV testing.

## 2025-04-01 LAB
HPV16 DNA CVX QL PROBE+SIG AMP: NEGATIVE
HPV18+45 E6+E7 MRNA CVX QL NAA+PROBE: NEGATIVE
SPECIMEN STATUS: NORMAL

## 2025-04-04 ENCOUNTER — TELEPHONE (OUTPATIENT)
Dept: OTOLARYNGOLOGY | Facility: CLINIC | Age: 45
End: 2025-04-04
Payer: COMMERCIAL

## 2025-04-04 NOTE — TELEPHONE ENCOUNTER
Left message with patient asking for a call back to see if she still wants referral to [facility]. Told patient to ask for me.

## 2025-05-28 ENCOUNTER — TRANSCRIBE ORDERS (OUTPATIENT)
Dept: MAMMOGRAPHY | Facility: HOSPITAL | Age: 45
End: 2025-05-28
Payer: COMMERCIAL